# Patient Record
Sex: FEMALE | Race: WHITE | NOT HISPANIC OR LATINO
[De-identification: names, ages, dates, MRNs, and addresses within clinical notes are randomized per-mention and may not be internally consistent; named-entity substitution may affect disease eponyms.]

---

## 2017-01-17 ENCOUNTER — APPOINTMENT (OUTPATIENT)
Dept: CT IMAGING | Facility: HOSPITAL | Age: 52
End: 2017-01-17

## 2017-06-23 ENCOUNTER — APPOINTMENT (OUTPATIENT)
Dept: FAMILY MEDICINE | Facility: CLINIC | Age: 52
End: 2017-06-23

## 2017-07-24 ENCOUNTER — APPOINTMENT (OUTPATIENT)
Dept: FAMILY MEDICINE | Facility: CLINIC | Age: 52
End: 2017-07-24

## 2017-11-07 ENCOUNTER — APPOINTMENT (OUTPATIENT)
Dept: FAMILY MEDICINE | Facility: CLINIC | Age: 52
End: 2017-11-07

## 2017-12-14 ENCOUNTER — APPOINTMENT (OUTPATIENT)
Dept: ULTRASOUND IMAGING | Facility: HOSPITAL | Age: 52
End: 2017-12-14
Payer: MEDICAID

## 2017-12-14 ENCOUNTER — OUTPATIENT (OUTPATIENT)
Dept: OUTPATIENT SERVICES | Facility: HOSPITAL | Age: 52
LOS: 1 days | End: 2017-12-14
Payer: MEDICAID

## 2017-12-14 DIAGNOSIS — Z98.89 OTHER SPECIFIED POSTPROCEDURAL STATES: Chronic | ICD-10-CM

## 2017-12-14 DIAGNOSIS — Z98.51 TUBAL LIGATION STATUS: Chronic | ICD-10-CM

## 2017-12-14 DIAGNOSIS — K76.0 FATTY (CHANGE OF) LIVER, NOT ELSEWHERE CLASSIFIED: ICD-10-CM

## 2017-12-14 DIAGNOSIS — R10.9 UNSPECIFIED ABDOMINAL PAIN: ICD-10-CM

## 2017-12-14 DIAGNOSIS — R16.0 HEPATOMEGALY, NOT ELSEWHERE CLASSIFIED: ICD-10-CM

## 2017-12-14 PROCEDURE — 76700 US EXAM ABDOM COMPLETE: CPT | Mod: 26

## 2017-12-14 PROCEDURE — 76700 US EXAM ABDOM COMPLETE: CPT

## 2017-12-22 ENCOUNTER — APPOINTMENT (OUTPATIENT)
Dept: MAMMOGRAPHY | Facility: HOSPITAL | Age: 52
End: 2017-12-22

## 2018-03-16 ENCOUNTER — EMERGENCY (EMERGENCY)
Facility: HOSPITAL | Age: 53
LOS: 1 days | Discharge: HOME | End: 2018-03-16
Admitting: EMERGENCY MEDICINE
Payer: COMMERCIAL

## 2018-03-16 DIAGNOSIS — N39.0 URINARY TRACT INFECTION, SITE NOT SPECIFIED: ICD-10-CM

## 2018-03-16 DIAGNOSIS — Z98.89 OTHER SPECIFIED POSTPROCEDURAL STATES: Chronic | ICD-10-CM

## 2018-03-16 DIAGNOSIS — E03.9 HYPOTHYROIDISM, UNSPECIFIED: ICD-10-CM

## 2018-03-16 DIAGNOSIS — Z98.51 TUBAL LIGATION STATUS: Chronic | ICD-10-CM

## 2018-03-16 DIAGNOSIS — Z88.5 ALLERGY STATUS TO NARCOTIC AGENT: ICD-10-CM

## 2018-03-16 DIAGNOSIS — I10 ESSENTIAL (PRIMARY) HYPERTENSION: ICD-10-CM

## 2018-03-16 DIAGNOSIS — Z79.899 OTHER LONG TERM (CURRENT) DRUG THERAPY: ICD-10-CM

## 2018-03-16 DIAGNOSIS — R68.83 CHILLS (WITHOUT FEVER): ICD-10-CM

## 2018-03-16 PROCEDURE — 71046 X-RAY EXAM CHEST 2 VIEWS: CPT | Mod: 26

## 2018-03-16 PROCEDURE — 71046 X-RAY EXAM CHEST 2 VIEWS: CPT

## 2018-03-16 PROCEDURE — 99284 EMERGENCY DEPT VISIT MOD MDM: CPT

## 2018-03-16 PROCEDURE — 81003 URINALYSIS AUTO W/O SCOPE: CPT

## 2018-03-16 PROCEDURE — 93010 ELECTROCARDIOGRAM REPORT: CPT

## 2018-03-16 PROCEDURE — 80053 COMPREHEN METABOLIC PANEL: CPT

## 2018-03-16 PROCEDURE — 85027 COMPLETE CBC AUTOMATED: CPT

## 2018-03-16 PROCEDURE — 93005 ELECTROCARDIOGRAM TRACING: CPT

## 2018-03-16 PROCEDURE — 96360 HYDRATION IV INFUSION INIT: CPT

## 2018-03-16 PROCEDURE — 99283 EMERGENCY DEPT VISIT LOW MDM: CPT | Mod: 25

## 2018-03-16 PROCEDURE — 87086 URINE CULTURE/COLONY COUNT: CPT

## 2018-04-18 ENCOUNTER — APPOINTMENT (OUTPATIENT)
Dept: UROLOGY | Facility: CLINIC | Age: 53
End: 2018-04-18
Payer: COMMERCIAL

## 2018-04-18 DIAGNOSIS — R10.9 UNSPECIFIED ABDOMINAL PAIN: ICD-10-CM

## 2018-04-18 DIAGNOSIS — Z80.42 FAMILY HISTORY OF MALIGNANT NEOPLASM OF PROSTATE: ICD-10-CM

## 2018-04-18 PROCEDURE — 99203 OFFICE O/P NEW LOW 30 MIN: CPT

## 2018-04-18 RX ORDER — FLUTICASONE PROPIONATE 50 UG/1
50 SPRAY, METERED NASAL
Qty: 16 | Refills: 0 | Status: ACTIVE | COMMUNITY
Start: 2017-05-22

## 2018-04-18 RX ORDER — AZITHROMYCIN 250 MG/1
250 TABLET, FILM COATED ORAL
Qty: 6 | Refills: 0 | Status: DISCONTINUED | COMMUNITY
Start: 2017-05-25

## 2018-04-19 LAB
APPEARANCE: ABNORMAL
BACTERIA: ABNORMAL
BILIRUBIN URINE: NEGATIVE
BLOOD URINE: NEGATIVE
COLOR: YELLOW
GLUCOSE QUALITATIVE U: NEGATIVE MG/DL
HYALINE CASTS: 3 /LPF
KETONES URINE: NEGATIVE
LEUKOCYTE ESTERASE URINE: NEGATIVE
MICROSCOPIC-UA: NORMAL
NITRITE URINE: NEGATIVE
PH URINE: 5
PROTEIN URINE: 30 MG/DL
RED BLOOD CELLS URINE: 3 /HPF
SPECIFIC GRAVITY URINE: 1.02
SQUAMOUS EPITHELIAL CELLS: 18 /HPF
UROBILINOGEN URINE: NEGATIVE MG/DL
WHITE BLOOD CELLS URINE: 2 /HPF

## 2018-04-24 LAB — BACTERIA UR CULT: NORMAL

## 2018-05-11 ENCOUNTER — APPOINTMENT (OUTPATIENT)
Dept: FAMILY MEDICINE | Facility: CLINIC | Age: 53
End: 2018-05-11

## 2018-07-24 ENCOUNTER — OUTPATIENT (OUTPATIENT)
Dept: OUTPATIENT SERVICES | Facility: HOSPITAL | Age: 53
LOS: 1 days | End: 2018-07-24
Payer: COMMERCIAL

## 2018-07-24 ENCOUNTER — APPOINTMENT (OUTPATIENT)
Dept: RADIOLOGY | Facility: HOSPITAL | Age: 53
End: 2018-07-24
Payer: COMMERCIAL

## 2018-07-24 DIAGNOSIS — Z98.89 OTHER SPECIFIED POSTPROCEDURAL STATES: Chronic | ICD-10-CM

## 2018-07-24 DIAGNOSIS — R53.83 OTHER FATIGUE: ICD-10-CM

## 2018-07-24 DIAGNOSIS — Z98.51 TUBAL LIGATION STATUS: Chronic | ICD-10-CM

## 2018-07-24 PROCEDURE — 71046 X-RAY EXAM CHEST 2 VIEWS: CPT

## 2018-07-24 PROCEDURE — 71046 X-RAY EXAM CHEST 2 VIEWS: CPT | Mod: 26

## 2018-11-26 ENCOUNTER — EMERGENCY (EMERGENCY)
Facility: HOSPITAL | Age: 53
LOS: 1 days | Discharge: HOME | End: 2018-11-26
Attending: EMERGENCY MEDICINE | Admitting: EMERGENCY MEDICINE
Payer: COMMERCIAL

## 2018-11-26 VITALS
TEMPERATURE: 99 F | DIASTOLIC BLOOD PRESSURE: 86 MMHG | SYSTOLIC BLOOD PRESSURE: 166 MMHG | OXYGEN SATURATION: 100 % | RESPIRATION RATE: 20 BRPM | WEIGHT: 179.9 LBS | HEART RATE: 123 BPM | HEIGHT: 60 IN

## 2018-11-26 VITALS
RESPIRATION RATE: 18 BRPM | SYSTOLIC BLOOD PRESSURE: 159 MMHG | OXYGEN SATURATION: 99 % | DIASTOLIC BLOOD PRESSURE: 84 MMHG | HEART RATE: 93 BPM

## 2018-11-26 DIAGNOSIS — Z98.89 OTHER SPECIFIED POSTPROCEDURAL STATES: Chronic | ICD-10-CM

## 2018-11-26 DIAGNOSIS — R06.02 SHORTNESS OF BREATH: ICD-10-CM

## 2018-11-26 DIAGNOSIS — Z98.51 TUBAL LIGATION STATUS: Chronic | ICD-10-CM

## 2018-11-26 PROCEDURE — 99284 EMERGENCY DEPT VISIT MOD MDM: CPT | Mod: 25

## 2018-11-26 PROCEDURE — 71046 X-RAY EXAM CHEST 2 VIEWS: CPT

## 2018-11-26 PROCEDURE — 94640 AIRWAY INHALATION TREATMENT: CPT

## 2018-11-26 PROCEDURE — 71046 X-RAY EXAM CHEST 2 VIEWS: CPT | Mod: 26

## 2018-11-26 RX ORDER — ALBUTEROL 90 UG/1
2 AEROSOL, METERED ORAL
Qty: 1 | Refills: 0
Start: 2018-11-26

## 2018-11-26 RX ORDER — AZITHROMYCIN 500 MG/1
1 TABLET, FILM COATED ORAL
Qty: 4 | Refills: 0
Start: 2018-11-26 | End: 2018-11-29

## 2018-11-26 RX ORDER — IPRATROPIUM/ALBUTEROL SULFATE 18-103MCG
3 AEROSOL WITH ADAPTER (GRAM) INHALATION ONCE
Qty: 0 | Refills: 0 | Status: COMPLETED | OUTPATIENT
Start: 2018-11-26 | End: 2018-11-26

## 2018-11-26 RX ORDER — ALBUTEROL 90 UG/1
3 AEROSOL, METERED ORAL
Qty: 30 | Refills: 0
Start: 2018-11-26

## 2018-11-26 RX ORDER — AZITHROMYCIN 500 MG/1
500 TABLET, FILM COATED ORAL ONCE
Qty: 0 | Refills: 0 | Status: COMPLETED | OUTPATIENT
Start: 2018-11-26 | End: 2018-11-26

## 2018-11-26 RX ADMIN — AZITHROMYCIN 500 MILLIGRAM(S): 500 TABLET, FILM COATED ORAL at 05:19

## 2018-11-26 RX ADMIN — Medication 3 MILLILITER(S): at 04:55

## 2018-11-26 NOTE — ED ADULT NURSE NOTE - PSH
History of tonsillectomy    S/P D&C (status post dilation and curettage)  2013  Tubal ligation status

## 2018-11-26 NOTE — ED ADULT NURSE NOTE - OBJECTIVE STATEMENT
Patient presents with dry cough, nasal congestion and shortness of breath x 1 days. Lungs clear. Able to speak in full sentences on room air. No use of accessory muscles noted. Denies fever/chills. Skin pink, warm and dry.

## 2018-11-26 NOTE — ED ADULT NURSE NOTE - RESPIRATORY ASSESSMENT
[facility]  [address] [id number] [phone]      March 13, 2017      Re: Jeffery Capps  [address] [facility] [address]        TO WHOM IT MAY CONCERN:    Jeffery Capps is a patient of mine who uses his Albuterol inhaler as needed.  Typically, he takes 2 puffs of the inhaler, 10 minutes before exercise.    Please allow Jeffery to use his inhaler, if needed, during the 3/15/17 field trip.          Sincerely,        Hailey Contreras MD/ac           - - -

## 2018-11-26 NOTE — ED PROVIDER NOTE - PMH
Anxiety disorder due to medical condition    Arthritis of shoulder    Carpal tunnel syndrome of left wrist    Dysmenorrhea    Fatty liver    Fibroids, intramural    GERD (gastroesophageal reflux disease)    Hypertension    Hypothyroid    Kidney cysts    Lupus anticoagulant positive  saw heme 6 mths ago, instructed to take aspirin, pt non compliant  Menorrhagia    Morbid obesity with BMI of 40.0-44.9, adult    Thalassemia

## 2018-11-26 NOTE — ED PROVIDER NOTE - RESPIRATORY, MLM
good bilat breath sounds, faint minimal expiratory wheeze, scattered. no resp distress. actively coughing

## 2018-11-26 NOTE — ED PROVIDER NOTE - MEDICAL DECISION MAKING DETAILS
cough, chest tightness, c other uri sxs, sick contacts. likely URI. check cxr r/o pna. trial of nebs. reassess

## 2018-11-26 NOTE — ED PROVIDER NOTE - OBJECTIVE STATEMENT
pt c/o mild sob this morning, assoc c moderate cough for last 2 days, nasal congestion , mild sore throat. no fever/chills. +sick contacts c cough, "cold".  no chest pain. feel mild tightness mid chest. no leg pain/swelling /recent periods of immobility

## 2019-01-16 ENCOUNTER — APPOINTMENT (OUTPATIENT)
Dept: CARDIOLOGY | Facility: CLINIC | Age: 54
End: 2019-01-16
Payer: MEDICAID

## 2019-01-16 ENCOUNTER — NON-APPOINTMENT (OUTPATIENT)
Age: 54
End: 2019-01-16

## 2019-01-16 VITALS
OXYGEN SATURATION: 99 % | HEART RATE: 77 BPM | WEIGHT: 209 LBS | BODY MASS INDEX: 41.03 KG/M2 | DIASTOLIC BLOOD PRESSURE: 82 MMHG | RESPIRATION RATE: 16 BRPM | HEIGHT: 60 IN | SYSTOLIC BLOOD PRESSURE: 144 MMHG

## 2019-01-16 VITALS — HEART RATE: 76 BPM | SYSTOLIC BLOOD PRESSURE: 140 MMHG | DIASTOLIC BLOOD PRESSURE: 72 MMHG

## 2019-01-16 DIAGNOSIS — R53.83 OTHER FATIGUE: ICD-10-CM

## 2019-01-16 DIAGNOSIS — Z78.9 OTHER SPECIFIED HEALTH STATUS: ICD-10-CM

## 2019-01-16 PROCEDURE — 93000 ELECTROCARDIOGRAM COMPLETE: CPT

## 2019-01-16 PROCEDURE — 93306 TTE W/DOPPLER COMPLETE: CPT

## 2019-01-16 PROCEDURE — 99204 OFFICE O/P NEW MOD 45 MIN: CPT

## 2019-01-22 ENCOUNTER — APPOINTMENT (OUTPATIENT)
Dept: OBGYN | Facility: CLINIC | Age: 54
End: 2019-01-22

## 2019-01-28 ENCOUNTER — APPOINTMENT (OUTPATIENT)
Dept: CARDIOLOGY | Facility: CLINIC | Age: 54
End: 2019-01-28

## 2019-02-04 ENCOUNTER — APPOINTMENT (OUTPATIENT)
Dept: CARDIOLOGY | Facility: CLINIC | Age: 54
End: 2019-02-04

## 2019-02-08 ENCOUNTER — APPOINTMENT (OUTPATIENT)
Dept: CARDIOLOGY | Facility: CLINIC | Age: 54
End: 2019-02-08
Payer: MEDICAID

## 2019-02-08 PROCEDURE — 93270 REMOTE 30 DAY ECG REV/REPORT: CPT

## 2019-03-19 ENCOUNTER — NON-APPOINTMENT (OUTPATIENT)
Age: 54
End: 2019-03-19

## 2019-03-19 ENCOUNTER — APPOINTMENT (OUTPATIENT)
Dept: CARDIOLOGY | Facility: CLINIC | Age: 54
End: 2019-03-19
Payer: MEDICAID

## 2019-03-19 VITALS
BODY MASS INDEX: 41.03 KG/M2 | DIASTOLIC BLOOD PRESSURE: 84 MMHG | WEIGHT: 209 LBS | SYSTOLIC BLOOD PRESSURE: 150 MMHG | HEIGHT: 60 IN | RESPIRATION RATE: 16 BRPM | HEART RATE: 83 BPM | OXYGEN SATURATION: 98 %

## 2019-03-19 VITALS — SYSTOLIC BLOOD PRESSURE: 130 MMHG | HEART RATE: 84 BPM | DIASTOLIC BLOOD PRESSURE: 80 MMHG

## 2019-03-19 PROCEDURE — 99214 OFFICE O/P EST MOD 30 MIN: CPT

## 2019-03-19 PROCEDURE — 93000 ELECTROCARDIOGRAM COMPLETE: CPT

## 2019-03-19 NOTE — PHYSICAL EXAM
[General Appearance - Well Developed] : well developed [Normal Appearance] : normal appearance [Well Groomed] : well groomed [General Appearance - Well Nourished] : well nourished [No Deformities] : no deformities [General Appearance - In No Acute Distress] : no acute distress [Normal Oral Mucosa] : normal oral mucosa [No Oral Pallor] : no oral pallor [No Oral Cyanosis] : no oral cyanosis [Normal Jugular Venous A Waves Present] : normal jugular venous A waves present [Normal Jugular Venous V Waves Present] : normal jugular venous V waves present [No Jugular Venous Pollard A Waves] : no jugular venous pollard A waves [Exaggerated Use Of Accessory Muscles For Inspiration] : no accessory muscle use [Respiration, Rhythm And Depth] : normal respiratory rhythm and effort [Abdomen Soft] : soft [Auscultation Breath Sounds / Voice Sounds] : lungs were clear to auscultation bilaterally [Abdomen Tenderness] : non-tender [Abdomen Mass (___ Cm)] : no abdominal mass palpated [Abnormal Walk] : normal gait [Gait - Sufficient For Exercise Testing] : the gait was sufficient for exercise testing [Nail Clubbing] : no clubbing of the fingernails [Cyanosis, Localized] : no localized cyanosis [Petechial Hemorrhages (___cm)] : no petechial hemorrhages [Skin Color & Pigmentation] : normal skin color and pigmentation [] : no rash [Skin Lesions] : no skin lesions [No Venous Stasis] : no venous stasis [No Skin Ulcers] : no skin ulcer [No Xanthoma] : no  xanthoma was observed [Mood] : the mood was normal [Oriented To Time, Place, And Person] : oriented to person, place, and time [Affect] : the affect was normal [Normal Rate] : normal [No Anxiety] : not feeling anxious [Normal S1] : normal S1 [Normal S2] : normal S2 [S3] : no S3 [S4] : no S4 [No Murmur] : no murmurs heard [Right Carotid Bruit] : no bruit heard over the right carotid [Left Carotid Bruit] : no bruit heard over the left carotid [Right Femoral Bruit] : no bruit heard over the right femoral artery [Left Femoral Bruit] : no bruit heard over the left femoral artery [2+] : right 2+ [No Pitting Edema] : no pitting edema present [No Abnormalities] : the abdominal aorta was not enlarged and no bruit was heard

## 2019-03-19 NOTE — REASON FOR VISIT
[Follow-Up - Clinic] : a clinic follow-up of [Palpitations] : palpitations [FreeTextEntry1] : She continues to complain of palpitations. She states they can last for up to an hour. They usually in the morning. She does state that there is a possibility it could be associated with anxiety. Her monitor revealed no arrhythmia.

## 2019-03-19 NOTE — ASSESSMENT
[FreeTextEntry1] : Impression:\par 1. Palpitations etiology uncertain in a patient with a structurally normal heart who has had anemia in the past as well as hypothyroidism\par \par Plan:\par 1. Due to patient's concern over "blockages" we'll get exercise stress test\par 2. Switch atenolol which she has been taking twice per day 25 mg  to metoprolol 50 mg twice per day

## 2019-03-20 PROCEDURE — 93272 ECG/REVIEW INTERPRET ONLY: CPT

## 2019-04-03 ENCOUNTER — APPOINTMENT (OUTPATIENT)
Dept: CARDIOLOGY | Facility: CLINIC | Age: 54
End: 2019-04-03

## 2020-01-30 ENCOUNTER — APPOINTMENT (OUTPATIENT)
Dept: FAMILY MEDICINE | Facility: CLINIC | Age: 55
End: 2020-01-30

## 2020-02-07 ENCOUNTER — APPOINTMENT (OUTPATIENT)
Dept: INTERNAL MEDICINE | Facility: CLINIC | Age: 55
End: 2020-02-07

## 2020-02-14 ENCOUNTER — APPOINTMENT (OUTPATIENT)
Dept: INTERNAL MEDICINE | Facility: CLINIC | Age: 55
End: 2020-02-14

## 2020-03-30 ENCOUNTER — APPOINTMENT (OUTPATIENT)
Dept: INTERNAL MEDICINE | Facility: CLINIC | Age: 55
End: 2020-03-30

## 2020-04-15 ENCOUNTER — RX RENEWAL (OUTPATIENT)
Age: 55
End: 2020-04-15

## 2020-06-04 ENCOUNTER — APPOINTMENT (OUTPATIENT)
Dept: CARDIOLOGY | Facility: CLINIC | Age: 55
End: 2020-06-04

## 2020-07-21 ENCOUNTER — NON-APPOINTMENT (OUTPATIENT)
Age: 55
End: 2020-07-21

## 2020-08-12 ENCOUNTER — APPOINTMENT (OUTPATIENT)
Dept: PULMONOLOGY | Facility: CLINIC | Age: 55
End: 2020-08-12

## 2021-01-09 PROBLEM — Z01.419 ENCOUNTER FOR ANNUAL ROUTINE GYNECOLOGICAL EXAMINATION: Status: RESOLVED | Noted: 2021-01-09 | Resolved: 2021-01-23

## 2021-01-09 NOTE — PHYSICAL EXAM
[Alert] : alert [Appropriately responsive] : appropriately responsive [No Acute Distress] : no acute distress [Soft] : soft [Non-tender] : non-tender [Non-distended] : non-distended [No HSM] : No HSM [No Lesions] : no lesions [No Mass] : no mass [Oriented x3] : oriented x3 [Examination Of The Breasts] : a normal appearance [No Masses] : no breast masses were palpable [Labia Minora] : normal [Labia Majora] : normal [Normal] : normal [Uterine Adnexae] : normal

## 2021-01-12 ENCOUNTER — APPOINTMENT (OUTPATIENT)
Dept: OBGYN | Facility: CLINIC | Age: 56
End: 2021-01-12

## 2021-01-12 DIAGNOSIS — Z01.419 ENCOUNTER FOR GYNECOLOGICAL EXAMINATION (GENERAL) (ROUTINE) W/OUT ABNORMAL FINDINGS: ICD-10-CM

## 2021-04-12 ENCOUNTER — APPOINTMENT (OUTPATIENT)
Dept: CARDIOLOGY | Facility: CLINIC | Age: 56
End: 2021-04-12
Payer: MEDICAID

## 2021-04-12 ENCOUNTER — NON-APPOINTMENT (OUTPATIENT)
Age: 56
End: 2021-04-12

## 2021-04-12 VITALS
DIASTOLIC BLOOD PRESSURE: 74 MMHG | HEIGHT: 60 IN | WEIGHT: 220 LBS | BODY MASS INDEX: 43.19 KG/M2 | OXYGEN SATURATION: 97 % | TEMPERATURE: 97.6 F | RESPIRATION RATE: 20 BRPM | SYSTOLIC BLOOD PRESSURE: 143 MMHG | HEART RATE: 90 BPM

## 2021-04-12 DIAGNOSIS — R00.2 PALPITATIONS: ICD-10-CM

## 2021-04-12 DIAGNOSIS — R94.31 ABNORMAL ELECTROCARDIOGRAM [ECG] [EKG]: ICD-10-CM

## 2021-04-12 PROCEDURE — 93000 ELECTROCARDIOGRAM COMPLETE: CPT

## 2021-04-12 PROCEDURE — 99072 ADDL SUPL MATRL&STAF TM PHE: CPT

## 2021-04-12 PROCEDURE — 99214 OFFICE O/P EST MOD 30 MIN: CPT

## 2021-04-12 NOTE — REASON FOR VISIT
[Follow-Up - Clinic] : a clinic follow-up of [Palpitations] : palpitations [FreeTextEntry2] : pt last saw Dr Barron 2019, c/o inc in palps at night, hears them in her ears, no sob or sscp, very anxious.

## 2021-04-12 NOTE — PHYSICAL EXAM
[General Appearance - Well Developed] : well developed [Normal Appearance] : normal appearance [Well Groomed] : well groomed [General Appearance - Well Nourished] : well nourished [No Deformities] : no deformities [General Appearance - In No Acute Distress] : no acute distress [Normal Oral Mucosa] : normal oral mucosa [No Oral Pallor] : no oral pallor [No Oral Cyanosis] : no oral cyanosis [Normal Jugular Venous A Waves Present] : normal jugular venous A waves present [Normal Jugular Venous V Waves Present] : normal jugular venous V waves present [No Jugular Venous Pollard A Waves] : no jugular venous pollard A waves [Respiration, Rhythm And Depth] : normal respiratory rhythm and effort [Exaggerated Use Of Accessory Muscles For Inspiration] : no accessory muscle use [Auscultation Breath Sounds / Voice Sounds] : lungs were clear to auscultation bilaterally [Abdomen Soft] : soft [Abdomen Tenderness] : non-tender [Abdomen Mass (___ Cm)] : no abdominal mass palpated [Abnormal Walk] : normal gait [Gait - Sufficient For Exercise Testing] : the gait was sufficient for exercise testing [Nail Clubbing] : no clubbing of the fingernails [Cyanosis, Localized] : no localized cyanosis [Petechial Hemorrhages (___cm)] : no petechial hemorrhages [Skin Color & Pigmentation] : normal skin color and pigmentation [] : no rash [No Venous Stasis] : no venous stasis [Skin Lesions] : no skin lesions [No Skin Ulcers] : no skin ulcer [No Xanthoma] : no  xanthoma was observed [Oriented To Time, Place, And Person] : oriented to person, place, and time [Affect] : the affect was normal [Mood] : the mood was normal [No Anxiety] : not feeling anxious [Normal Rate] : normal [Normal S1] : normal S1 [Normal S2] : normal S2 [No Murmur] : no murmurs heard [2+] : left 2+ [No Abnormalities] : the abdominal aorta was not enlarged and no bruit was heard [No Pitting Edema] : no pitting edema present [S3] : no S3 [S4] : no S4 [Right Carotid Bruit] : no bruit heard over the right carotid [Left Carotid Bruit] : no bruit heard over the left carotid [Right Femoral Bruit] : no bruit heard over the right femoral artery [Left Femoral Bruit] : no bruit heard over the left femoral artery

## 2021-04-12 NOTE — DISCUSSION/SUMMARY
[Holter Monitor] : a Holter monitor [Echocardiogram] : an echocardiogram [de-identified] : carotid doppler [de-identified] : doesn’t take her metoprolol

## 2021-04-15 ENCOUNTER — APPOINTMENT (OUTPATIENT)
Dept: CARDIOLOGY | Facility: CLINIC | Age: 56
End: 2021-04-15
Payer: MEDICAID

## 2021-04-15 PROCEDURE — 93306 TTE W/DOPPLER COMPLETE: CPT

## 2021-04-15 PROCEDURE — 99072 ADDL SUPL MATRL&STAF TM PHE: CPT

## 2021-04-20 ENCOUNTER — NON-APPOINTMENT (OUTPATIENT)
Age: 56
End: 2021-04-20

## 2021-04-20 PROCEDURE — 99072 ADDL SUPL MATRL&STAF TM PHE: CPT

## 2021-04-20 PROCEDURE — 93224 XTRNL ECG REC UP TO 48 HRS: CPT

## 2021-04-27 ENCOUNTER — APPOINTMENT (OUTPATIENT)
Dept: CARDIOLOGY | Facility: CLINIC | Age: 56
End: 2021-04-27

## 2021-10-12 ENCOUNTER — APPOINTMENT (OUTPATIENT)
Dept: RADIOLOGY | Facility: HOSPITAL | Age: 56
End: 2021-10-12
Payer: MEDICAID

## 2021-10-12 ENCOUNTER — OUTPATIENT (OUTPATIENT)
Dept: OUTPATIENT SERVICES | Facility: HOSPITAL | Age: 56
LOS: 1 days | End: 2021-10-12
Payer: COMMERCIAL

## 2021-10-12 DIAGNOSIS — M25.561 PAIN IN RIGHT KNEE: ICD-10-CM

## 2021-10-12 DIAGNOSIS — M25.562 PAIN IN LEFT KNEE: ICD-10-CM

## 2021-10-12 DIAGNOSIS — Z98.89 OTHER SPECIFIED POSTPROCEDURAL STATES: Chronic | ICD-10-CM

## 2021-10-12 DIAGNOSIS — Z98.51 TUBAL LIGATION STATUS: Chronic | ICD-10-CM

## 2021-10-12 PROCEDURE — 73562 X-RAY EXAM OF KNEE 3: CPT | Mod: 26,50

## 2021-10-12 PROCEDURE — 73562 X-RAY EXAM OF KNEE 3: CPT

## 2021-12-23 ENCOUNTER — EMERGENCY (EMERGENCY)
Facility: HOSPITAL | Age: 56
LOS: 1 days | Discharge: HOME | End: 2021-12-23
Attending: EMERGENCY MEDICINE | Admitting: EMERGENCY MEDICINE
Payer: SELF-PAY

## 2021-12-23 VITALS
HEART RATE: 87 BPM | DIASTOLIC BLOOD PRESSURE: 83 MMHG | SYSTOLIC BLOOD PRESSURE: 179 MMHG | WEIGHT: 190.04 LBS | OXYGEN SATURATION: 99 % | RESPIRATION RATE: 16 BRPM | TEMPERATURE: 99 F | HEIGHT: 60 IN

## 2021-12-23 DIAGNOSIS — Z98.51 TUBAL LIGATION STATUS: Chronic | ICD-10-CM

## 2021-12-23 DIAGNOSIS — Z98.89 OTHER SPECIFIED POSTPROCEDURAL STATES: Chronic | ICD-10-CM

## 2021-12-23 PROCEDURE — U0003: CPT

## 2021-12-23 PROCEDURE — 99284 EMERGENCY DEPT VISIT MOD MDM: CPT

## 2021-12-23 PROCEDURE — 99283 EMERGENCY DEPT VISIT LOW MDM: CPT

## 2021-12-23 PROCEDURE — U0005: CPT

## 2021-12-23 NOTE — ED PROVIDER NOTE - PATIENT PORTAL LINK FT
You can access the FollowMyHealth Patient Portal offered by [facility] by registering at the following website: [email or web address]. By joining [employer]’s FollowMyHealth portal, you will also be able to view your health information using other applications (apps) compatible with our system.

## 2021-12-23 NOTE — ED PROVIDER NOTE - NSICDXPASTSURGICALHX_GEN_ALL_CORE_FT
PAST SURGICAL HISTORY:  History of tonsillectomy     S/P D&C (status post dilation and curettage) 2013    Tubal ligation status

## 2021-12-23 NOTE — ED PROVIDER NOTE - NSFOLLOWUPINSTRUCTIONS_ED_ALL_ED_FT
Follow up with your Primary Care Physician within 1-2 days.   You have given us permission to text you your COVID test results which may take 1-2 days  Stay home and quarantine yourself until we get your test results.  Rest, increase your fluids.   Take Tylenol 650mg every 4-6 hours as needed for pain   Take all of your medications as previously prescribed.   If you have not heard from us in 2 days you can call  [phone]  Worsening, continued or ANY new concerning symptoms return to the Emergency Department.       Head Injury, Adult:    There are many types of head injuries. Head injuries can be as minor as a bump, or they can be more severe. More severe head injuries include:  A jarring injury to the brain (concussion).  A bruise of the brain (contusion). This means there is bleeding in the brain that can cause swelling.  A cracked skull (skull fracture).  Bleeding in the brain that collects, clots, and forms a bump (hematoma).  After a head injury, you may need to be observed for a while in the emergency department or urgent care. Sometimes admission to the hospital is needed.    After a head injury has happened, most problems occur within the first 24 hours, but side effects may occur up to 7–10 days after the injury. It is important to watch your condition for any changes.    What are the causes?  There are many possible causes of a head injury. A serious head injury may happen to someone who is in a car accident (motor vehicle collision). Other causes of major head injuries include bicycle or motorcycle accidents, sports injuries, and falls.    Risk factors  This condition is more likely to occur in people who:  Drink a lot of alcohol or use drugs.  Are over the age of 65.  Are at risk for falls.  What are the symptoms?  There are many possible symptoms of a head injury. Visible symptoms of a head injury include a bruise, bump, or bleeding at the site of the injury. Other non-visible symptoms include:  Feeling sleepy or not being able to stay awake.  Passing out.  Headache.  Seizures.  Dizziness.  Confusion.  Memory problems.  Nausea or vomiting.  Other possible symptoms that may develop after the head injury include:  Poor attention and concentration.  Fatigue or tiring easily.  Irritability.  Being uncomfortable around bright lights or loud noises.  Anxiety or depression.  Disturbed sleep.  How is this diagnosed?  This condition can usually be diagnosed based on your symptoms, a description of the injury, and a physical exam. You may also have imaging tests done, such as a CT scan or MRI. You will also be closely watched.    How is this treated?  Treatment for this condition depends on the severity and type of injury you have. The main goal of treatment is to prevent complications and allow the brain time to heal.    For mild head injury, you may be sent home and treatment may include:  Observation. A responsible adult should stay with you for 24 hours after your injury and check on you often.  Physical rest.  Brain rest.  Pain medicines.  For severe brain injury, treatment may include:  Close observation. This includes hospitalization with frequent physical exams. You may need to go to a hospital that specializes in head injury.  Pain medicines.  Breathing support. This may include using a ventilator.  Managing the pressure inside the brain (intracranial pressure, or ICP). This may include:  Monitoring the ICP.  Giving medicines to decrease the ICP.  Positioning you to decrease the ICP.  Medicine to prevent seizures.  Surgery to stop bleeding or to remove blood clots (craniotomy).  Surgery to remove part of the skull (decompressive craniectomy). This allows room for the brain to swell.  Follow these instructions at home:  Activity     Rest as much as possible and avoid activities that are physically hard or tiring.  Make sure you get enough sleep.  Limit activities that require a lot of thought or attention, such as:  Watching TV.  Playing memory games and puzzles.  Job-related work or homework.  Working on the computer, social media, and texting.  Avoid activities that could cause another head injury, such as playing sports, until your health care provider approves. Having another head injury, especially before the first one has healed, can be dangerous.  Ask your health care provider when it is safe for you to return to your regular activities, including work or school. Ask your health care provider for a step-by-step plan for gradually returning to activities.  Ask your health care provider when you can drive, ride a bicycle, or use heavy machinery. Your ability to react may be slower after a brain injury. Never do these activities if you are dizzy.  Lifestyle     Do not drink alcohol until your health care provider approves, and avoid drug use. Alcohol and certain drugs may slow your recovery and can put you at risk of further injury.  If it is harder than usual to remember things, write them down.  If you are easily distracted, try to do one thing at a time.  Talk with family members or close friends when making important decisions.  Tell your friends, family, a trusted colleague, and  about your injury, symptoms, and restrictions. Have them watch for any new or worsening problems.  General instructions     Take over-the-counter and prescription medicines only as told by your health care provider.  Have someone stay with you for 24 hours after your head injury. This person should watch you for any changes in your symptoms and be ready to seek medical help, as needed.  Keep all follow-up visits as told by your health care provider. This is important.  How is this prevented?  Work on improving your balance and strength to avoid falls.  Wear a seatbelt when you are in a moving vehicle.  Wear a helmet when riding a bicycle, skiing, or doing any other sport or activity that has a risk of injury.  Drink alcohol only in moderation.  Take safety measures in your home, such as:  Removing clutter and tripping hazards from floors and stairways.  Using grab bars in bathrooms and handrails by stairs.  Placing non-slip mats on floors and in bathtubs.  Improving lighting in dim areas.  Get help right away if:  You have:  A severe headache that is not helped by medicine.  Trouble walking, have weakness in your arms and legs, or lose your balance.  Clear or bloody fluid coming from your nose or ears.  Changes in your vision.  A seizure.  You vomit.  Your symptoms get worse.  Your speech is slurred.  You pass out.  You are sleepier and have trouble staying awake.  Your pupils change size.  These symptoms may represent a serious problem that is an emergency. Do not wait to see if the symptoms will go away. Get medical help right away. Call your local emergency services (911 in the [place] Do not drive yourself to the hospital.     This information is not intended to replace advice given to you by your health care provider. Make sure you discuss any questions you have with your health care provider.
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
## 2021-12-23 NOTE — ED PROVIDER NOTE - NSICDXPASTMEDICALHX_GEN_ALL_CORE_FT
PAST MEDICAL HISTORY:  Anxiety disorder due to medical condition     Arthritis of shoulder     Carpal tunnel syndrome of left wrist     Dysmenorrhea     Fatty liver     Fibroids, intramural     GERD (gastroesophageal reflux disease)     Hypertension     Hypothyroid     Kidney cysts     Lupus anticoagulant positive saw heme 6 mths ago, instructed to take aspirin, pt non compliant    Menorrhagia     Morbid obesity with BMI of 40.0-44.9, adult     Thalassemia

## 2021-12-24 LAB — SARS-COV-2 RNA SPEC QL NAA+PROBE: SIGNIFICANT CHANGE UP

## 2022-01-04 ENCOUNTER — APPOINTMENT (OUTPATIENT)
Dept: RADIOLOGY | Facility: HOSPITAL | Age: 57
End: 2022-01-04
Payer: MEDICAID

## 2022-01-04 ENCOUNTER — OUTPATIENT (OUTPATIENT)
Dept: OUTPATIENT SERVICES | Facility: HOSPITAL | Age: 57
LOS: 1 days | End: 2022-01-04
Payer: MEDICAID

## 2022-01-04 DIAGNOSIS — Z98.51 TUBAL LIGATION STATUS: Chronic | ICD-10-CM

## 2022-01-04 DIAGNOSIS — R05.9 COUGH, UNSPECIFIED: ICD-10-CM

## 2022-01-04 DIAGNOSIS — Z98.89 OTHER SPECIFIED POSTPROCEDURAL STATES: Chronic | ICD-10-CM

## 2022-01-04 DIAGNOSIS — U07.1 COVID-19: ICD-10-CM

## 2022-01-04 PROCEDURE — 71046 X-RAY EXAM CHEST 2 VIEWS: CPT | Mod: 26

## 2022-01-04 PROCEDURE — 71046 X-RAY EXAM CHEST 2 VIEWS: CPT

## 2022-02-15 NOTE — ED PROVIDER NOTE - CPE EDP ENMT NORM
normal... Bilobed Transposition Flap Text: The defect edges were debeveled with a #15 scalpel blade.  Given the location of the defect and the proximity to free margins a bilobed transposition flap was deemed most appropriate.  Using a sterile surgical marker, an appropriate bilobe flap drawn around the defect.    The area thus outlined was incised deep to adipose tissue with a #15 scalpel blade.  The skin margins were undermined to an appropriate distance in all directions utilizing iris scissors.

## 2022-02-19 ENCOUNTER — EMERGENCY (EMERGENCY)
Facility: HOSPITAL | Age: 57
LOS: 1 days | Discharge: HOME | End: 2022-02-19
Attending: STUDENT IN AN ORGANIZED HEALTH CARE EDUCATION/TRAINING PROGRAM | Admitting: STUDENT IN AN ORGANIZED HEALTH CARE EDUCATION/TRAINING PROGRAM
Payer: MEDICAID

## 2022-02-19 VITALS
RESPIRATION RATE: 24 BRPM | HEIGHT: 60 IN | OXYGEN SATURATION: 100 % | SYSTOLIC BLOOD PRESSURE: 171 MMHG | DIASTOLIC BLOOD PRESSURE: 84 MMHG | WEIGHT: 184.97 LBS | HEART RATE: 100 BPM | TEMPERATURE: 99 F

## 2022-02-19 VITALS
HEART RATE: 77 BPM | DIASTOLIC BLOOD PRESSURE: 78 MMHG | SYSTOLIC BLOOD PRESSURE: 130 MMHG | OXYGEN SATURATION: 98 % | RESPIRATION RATE: 20 BRPM | TEMPERATURE: 98 F

## 2022-02-19 DIAGNOSIS — Z98.51 TUBAL LIGATION STATUS: Chronic | ICD-10-CM

## 2022-02-19 DIAGNOSIS — Z98.89 OTHER SPECIFIED POSTPROCEDURAL STATES: Chronic | ICD-10-CM

## 2022-02-19 LAB
FLUAV AG NPH QL: SIGNIFICANT CHANGE UP
FLUBV AG NPH QL: SIGNIFICANT CHANGE UP
RSV RNA NPH QL NAA+NON-PROBE: SIGNIFICANT CHANGE UP
SARS-COV-2 RNA SPEC QL NAA+PROBE: SIGNIFICANT CHANGE UP

## 2022-02-19 PROCEDURE — 71045 X-RAY EXAM CHEST 1 VIEW: CPT | Mod: 26

## 2022-02-19 PROCEDURE — 87637 SARSCOV2&INF A&B&RSV AMP PRB: CPT

## 2022-02-19 PROCEDURE — 99284 EMERGENCY DEPT VISIT MOD MDM: CPT

## 2022-02-19 PROCEDURE — 71045 X-RAY EXAM CHEST 1 VIEW: CPT

## 2022-02-19 PROCEDURE — 94640 AIRWAY INHALATION TREATMENT: CPT

## 2022-02-19 PROCEDURE — 99283 EMERGENCY DEPT VISIT LOW MDM: CPT | Mod: 25

## 2022-02-19 RX ORDER — IPRATROPIUM/ALBUTEROL SULFATE 18-103MCG
3 AEROSOL WITH ADAPTER (GRAM) INHALATION ONCE
Refills: 0 | Status: COMPLETED | OUTPATIENT
Start: 2022-02-19 | End: 2022-02-19

## 2022-02-19 RX ORDER — ACETAMINOPHEN 500 MG
650 TABLET ORAL ONCE
Refills: 0 | Status: COMPLETED | OUTPATIENT
Start: 2022-02-19 | End: 2022-02-19

## 2022-02-19 RX ADMIN — Medication 650 MILLIGRAM(S): at 05:19

## 2022-02-19 RX ADMIN — Medication 100 MILLIGRAM(S): at 05:19

## 2022-02-19 RX ADMIN — Medication 3 MILLILITER(S): at 05:19

## 2022-02-19 NOTE — ED PROVIDER NOTE - OBJECTIVE STATEMENT
57 yo F hx hypothyroid, HTN, pw 1 day non productive cough and sob. 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 Pt used home nebulizer yesterday, did not feel better.

## 2022-02-19 NOTE — ED PROVIDER NOTE - PHYSICAL EXAMINATION
GEN: Patient awake alert NAD.   HEENT: normocephalic, atraumatic, EOMI, +mild cobblestoning in posterior pharynx.   CARDIAC: +RRR, S1, S2, no murmur.   PULM: CTA B/L + minimal wheeze, no rhonchi, no rales.   ABD: soft NT, ND, no rebound no guarding  MSK: Moving all extremities, no edema. 5/5 strength and full ROM in all extremities.     NEURO: A&Ox3, gait normal, no focal neurological deficits, CN 2-12 grossly intact  SKIN: warm, dry, no rash.

## 2022-02-19 NOTE — ED PROVIDER NOTE - PATIENT PORTAL LINK FT
You can access the FollowMyHealth Patient Portal offered by [facility] by registering at the following website: [email or web address]. By joining [employer]’s FollowMyHealth portal, you will also be able to view your health information using other applications (apps) compatible with our system.

## 2022-02-19 NOTE — ED ADULT NURSE REASSESSMENT NOTE - NS ED NURSE REASSESS COMMENT FT1
Patient reports feeling better at this time. No longer with cough and reports that breathing has improved.

## 2022-02-19 NOTE — ED ADULT TRIAGE NOTE - CHIEF COMPLAINT QUOTE
Patient comes to ER for several day history of dry-cough and congestion, +sick contacts at home (grandchildren). Reports that tonight woke up with shortness of breath. +dry, hacking cough in triage with no sputum production, low grade fever reported at home (99F).

## 2022-02-19 NOTE — ED PROVIDER NOTE - NSFOLLOWUPINSTRUCTIONS_ED_ALL_ED_FT
** You have bronchitis from your viral upper respiratory tract infection.   ** Take over the counter Tylenol for fever. Stay hydrated  ** Your preliminary xray does not show a pneumonia.   ** Follow up with your primary care doctor in the next 72 hours.     ** Go to the nearest Emergency Department if you experience any new or concerning symptoms, such as:   - chest pain  - difficulty breathing  - passing out  - unable to eat or drink  - unable to move or feel part of your body  - fever, chills ** You have bronchitis from your viral upper respiratory tract infection.  ** Your preliminary xray does not show a pneumonia.   ** Follow up with your primary care doctor in the next 72 hours.     Take cough drops, hot tea with honey, use your humidifier, try to limit your coughing.     ** Go to the nearest Emergency Department if you experience any new or concerning symptoms, such as:   - chest pain  - difficulty breathing  - passing out  - unable to eat or drink  - unable to move or feel part of your body  - fever, chills

## 2022-02-19 NOTE — ED PROVIDER NOTE - CLINICAL SUMMARY MEDICAL DECISION MAKING FREE TEXT BOX
55 yo F hx hypothyroid, HTN, pw 1 day non productive cough and sob. + sick contacts. + minimal wheeze, + cobblestoning. Likely bronchitis 2/2 to post nasal drip from URI. Lower suspicion for PNA. low suspicion for bacterial PNA. symptom management with neb, tesselon musa, flu swab, CXR (r/o PNA). Likely DC home

## 2022-04-09 ENCOUNTER — EMERGENCY (EMERGENCY)
Facility: HOSPITAL | Age: 57
LOS: 1 days | Discharge: HOME | End: 2022-04-09
Attending: STUDENT IN AN ORGANIZED HEALTH CARE EDUCATION/TRAINING PROGRAM | Admitting: STUDENT IN AN ORGANIZED HEALTH CARE EDUCATION/TRAINING PROGRAM
Payer: MEDICAID

## 2022-04-09 VITALS
RESPIRATION RATE: 18 BRPM | SYSTOLIC BLOOD PRESSURE: 142 MMHG | DIASTOLIC BLOOD PRESSURE: 75 MMHG | HEART RATE: 89 BPM | OXYGEN SATURATION: 99 %

## 2022-04-09 VITALS
RESPIRATION RATE: 22 BRPM | OXYGEN SATURATION: 98 % | DIASTOLIC BLOOD PRESSURE: 84 MMHG | WEIGHT: 190.04 LBS | HEART RATE: 102 BPM | HEIGHT: 60 IN | SYSTOLIC BLOOD PRESSURE: 153 MMHG | TEMPERATURE: 99 F

## 2022-04-09 DIAGNOSIS — Z98.89 OTHER SPECIFIED POSTPROCEDURAL STATES: Chronic | ICD-10-CM

## 2022-04-09 DIAGNOSIS — Z98.51 TUBAL LIGATION STATUS: Chronic | ICD-10-CM

## 2022-04-09 PROCEDURE — 71045 X-RAY EXAM CHEST 1 VIEW: CPT | Mod: 26

## 2022-04-09 PROCEDURE — 99283 EMERGENCY DEPT VISIT LOW MDM: CPT | Mod: 25

## 2022-04-09 PROCEDURE — 71045 X-RAY EXAM CHEST 1 VIEW: CPT

## 2022-04-09 PROCEDURE — 99284 EMERGENCY DEPT VISIT MOD MDM: CPT

## 2022-04-09 PROCEDURE — 94640 AIRWAY INHALATION TREATMENT: CPT

## 2022-04-09 RX ORDER — IPRATROPIUM/ALBUTEROL SULFATE 18-103MCG
3 AEROSOL WITH ADAPTER (GRAM) INHALATION ONCE
Refills: 0 | Status: COMPLETED | OUTPATIENT
Start: 2022-04-09 | End: 2022-04-09

## 2022-04-09 RX ADMIN — Medication 3 MILLILITER(S): at 00:49

## 2022-04-09 NOTE — ED PROVIDER NOTE - CLINICAL SUMMARY MEDICAL DECISION MAKING FREE TEXT BOX
55 yO F hx of hypothyroid, pw 1 day non productive cough. 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 No fever chills, no CP, speaks in full sentences, spo2 100% on RA.     incomplete 55 yO F hx of hypothyroid, pw 1 day non productive cough. 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 No fever chills, no CP, speaks in full sentences, spo2 100% on RA. + minimal exp on exam. ddx: viral URI. Very low suspicion for PNA given sick contacts, well appearance but will obtain cxr. DC home with pmd fu.

## 2022-04-09 NOTE — ED ADULT TRIAGE NOTE - AS HEIGHT TYPE
Mom called with concerns about GJ tube site. She reports that [address] tube site looks erythematous, with purulent discharge and tenderness. No problems with GJ tube function. Suggested that this is most likely  Gastrostomy site infection and recommended the following:     - Clean the site with soap and water 3 times daily  - Apply Mupirocin on site 3 times daily  - Start Keflex for 10 days given significant erythema and tenderness.   - Follow up with Selam Lundberg NP or Dr. Armin Crespo next week. Orders Placed This Encounter    cephALEXin (KEFLEX) 250 mg/5 mL suspension     Sig: Take 3.2 mL by mouth four (4) times daily for 10 days. Dispense:  130 mL     Refill:  0    mupirocin (BACTROBAN) 2 % ointment     Sig: Apply  to affected area three (3) times daily.      Dispense:  22 g     Refill:  Kelsea Sterling MD  [facility]  10/04/20 9:47 AM stated

## 2022-04-09 NOTE — ED ADULT TRIAGE NOTE - BSA (M2)
DELIVERY ROOM CONSULTATION      NICU Delivery Room Consult: Reason for Consult: Caesarian Section and Failed Vaccuum extraction, Consult requested by: Dr. Parry    Mom is:  Information for the patient's mother:  Angie Vee [[id number]]   21 year old      Information for the patient's mother:  Angie Vee [[id number]]       Gestational Age: 41w1d at delivery    Maternal history includes:    Information for the patient's mother:  Angie Vee [[id number]]     Past Medical History:   Diagnosis Date   • Bipolar 1 disorder, depressed (CMS/HCC)    • JESS (generalized anxiety disorder)    • Obesity       Information for the patient's mother:  Vee Adams [[id number]]     Social History     Tobacco Use   • Smoking status: Never Smoker   • Smokeless tobacco: Never Used   Vaping Use   • Vaping Use: never used   Substance Use Topics   • Alcohol use: Never   • Drug use: Never      Maternal Social History  Information for the patient's mother:  DangeloVee franks [[id number]]     Social History     Tobacco Use   • Smoking status: Never Smoker   • Smokeless tobacco: Never Used   Vaping Use   • Vaping Use: never used   Substance Use Topics   • Alcohol use: Never   • Drug use: Never      Information for the patient's mother:  Vee Adams [[id number]]     Sexually Active: Not Asked        Information for the patient's mother:  Vee Adams [[id number]]     Drug Use:    Never           Information for the patient's mother:  Vee Adams [[id number]]   Review of patient's social economics indicates:  No social economics status on file     Information for the patient's mother:  AngieVee [[id number]]     Medications Prior to Admission   Medication Sig Dispense Refill   • risperiDONE (RisperDAL) 0.25 MG tablet TAKE 1 TABLET BY MOUTH TWICE DAILY 60 tablet 2   • Prenatal Vit-Fe Fumarate-FA (PRENATAL VITAMIN PO)           Pregnancy  1.83 Complications:  None   Prenatal Labs:  Information for the patient's mother:  Vee Adams [[id number]]     Recent Labs   Lab 02/09/21  1442 02/09/21  1427   HIV Antigen/ Antibody Combo Screen Nonreactive  --    Treponema Pallidum Antibody, IgG and IgM Nonreactive  --    Neisseria gonorrhoeae by Nucleic Acid Amplification  --  Negative   Chlamydia trachomatis by Nucleic Acid Amplification  --  Negative   Rubella Antibody, IgG 330.4  --         Maternal HepB status: Resulted - Negative/Non-Reactive  Maternal RPR status: Resulted - Negative/Non-Reactive  Maternal GBBS status: Resulted - Negative  Maternal HIV status: Resulted - Negative/Non-Reactive  Maternal Rubella status: Resulted - Immune                Maternal Inpatient Meds:  Information for the patient's mother:  AngieVee [[id number]]     Current Facility-Administered Medications   Medication   • lactated ringers bolus 250 mL   • azithromycin (ZITHROMAX) 500 mg in sodium chloride 0.9 % 250 mL IVPB   • ceFAZolin (ANCEF) syringe 2,000 mg   • risperiDONE (RisperDAL) tablet 0.25 mg   • measles-mumps-rubella vaccine 0.5 mL   • varicella virus (VARIVAX) live vaccine 0.5 mL   • diphtheria-pertussis (acellular)-tetanus (BOOSTRIX) 5-2.5-18.5 LF-MCG/0.5 vaccine 0.5 mL   • oxytocin (PITOCIN) 30 Units in sodium chloride 0.9% 500 mL   • multivitamin & mineral w/folic acid 1 mg-PRENATAL tablet 1 tablet   • benzocaine/menthol (DERMOPLAST) 20-0.5 % topical spray 1 spray   • hydroCORTisone (ANUSOL-HC) suppository 25 mg   • simethicone (MYLICON) tablet 125 mg   • calcium carbonate (TUMS) chewable tablet 500 mg   • ondansetron (ZOFRAN) injection 4 mg   • prochlorperazine (COMPAZINE) tablet 5 mg   • prochlorperazine (COMPAZINE) injection 5 mg   • docusate sodium-sennosides (SENOKOT S) 50-8.6 MG 2 tablet   • lactated ringers infusion   • acetaminophen (TYLENOL) tablet 650 mg   • HYDROcodone-acetaminophen (NORCO) 5-325 MG per tablet 1 tablet   • gentamicin (GARAMYCIN) 310 mg in  sodium chloride 0.9 % 50 mL IVPB   • bisacodyl (DULCOLAX) suppository 10 mg   • polyethylene glycol (MIRALAX) packet 17 g   • magnesium hydroxide (MILK OF MAGNESIA) 400 MG/5ML suspension 30 mL   • ketorolac injection 30 mg    Followed by   • ibuprofen (MOTRIN) tablet 600 mg        Inpatient Baby Meds:  • hepatitis B  0.5 mL Intramuscular Once    lidocaine HCl (PF), hepatitis B IMMUNE GLOBULIN     Labor  Delivery Method:  , Low Transverse [251]  Rupture Date:  2021  Rupture Time: 6:15 AM  YOB: 2020  Time of Birth:  10:37 PM       BIRTH HISTORY:     Delivery Method: , Low Transverse [251]   Rupture date & time: 2021 6:15 AM   Date & time of birth 2020 10:37 PM   Induction:       Labor complications:       Placenta appearance: Intact   Cord info/complications: 3 Vessels None       Delayed cord clamping: No   Indications for : Cephalopelvic Disproportion;Arrest of Descent   Presentation/position: Vertex Right Occiput Posterior   Forceps attempted? No     Vacuum attempted? Yes     Shoulder dystocia? No                         Resuscitation:    Baby required Drying,   Suctioning    Narrative Summary: baby active and vigorou           APGARS  One minute Five minutes   Skin color: 0  1    Heart rate: 2  2     Reflex: 2  2    Muscle tone: 2  2    Breathin  2    Totals:   8  9        Birth Measurements:  Weight:  8 lb 6 oz (3800 g)    Length:  21\"    Head circumference:  36.2 cm     Health Care Maintenance:  Boy's Birth Weight:  8 lb 6 oz (3800 g)   Wt Readings from Last 4 Encounters:   21 3800 g (81 %, Z= 0.89)*     * Growth percentiles are based on WHO (Boys, 0-2 years) data.     Change from birth weight:  0%    No results found  Recent Labs   Lab 21  0752   GLUCOSE BEDSIDE 48       Hematologic/Blood Type:   Infant: No results found          Transcutaneous Bilirubin  TCB Result:    TCB Site:          Labs (Last 24 hours)  Recent Results (from the past  24 hour(s))   CORD BLOOD EVAL SAVE    Collection Time: 07/23/21 10:54 PM   Result Value Ref Range    Extra Tube Hold for Add Ons    Blood Gas, Arterial Cord Blood    Collection Time: 07/23/21 10:54 PM   Result Value Ref Range    pH, Arterial Cord Blood 7.24 7.18 - 7.38 Units    pCO2, Arterial Cord Blood 55 31 - 74 mm Hg    pO2, Arterial Cord Blood <25 6 - 31 mm Hg    HCO3, Arterial Cord Blood 23 21 - 28 mmol/L    Base Excess/ Deficit, Arterial Cord Blood -6 mmol/L   CBC with Automated Differential (performable only)    Collection Time: 07/23/21 11:44 PM   Result Value Ref Range    WBC 17.8 5.0 - 30.0 K/mcL    RBC 4.93 3.90 - 5.50 mil/mcL    HGB 18.5 13.5 - 19.5 g/dL    HCT 54.8 42.0 - 60.0 %    .2 98.0 - 118.0 fl    MCH 37.5 (H) 31.0 - 37.0 pg    MCHC 33.8 30.0 - 36.0 g/dL    RDW-CV 19.7 (H) 11.0 - 15.0 %    RDW-SD 78.3 (H) 39.0 - 54.0 fL     140 - 450 K/mcL    NRBC 19 (H) <=0 /100 WBC   Manual Differential    Collection Time: 07/23/21 11:44 PM   Result Value Ref Range    Neutrophil, Percent 62 %    Lymphocytes, Percent 27 %    Mono, Percent 8 %    Eosinophils, Percent 0 %    Basophils, Percent 0 %    Bands, Percent 3 0 - 10 %    Absolute Neutrophil 11.6 6.0 - 26.0 K/mcL    Absolute Lymphocytes 4.8 2.0 - 11.0 K/mcL    Absolute Monocytes 1.4 0.4 - 1.8 K/mcL    Absolute Eosinophils 0.0 0.0 - 0.7 K/mcL    Absolute Basophils 0.0 0.0 - 0.6 K/mcL    WBC Morphology Normal Normal    Macrocytosis Moderate     Platelet Morphology Normal Normal    Polychromasia Few    GLUCOSE, BEDSIDE - POINT OF CARE    Collection Time: 07/24/21  7:52 AM   Result Value Ref Range    GLUCOSE, BEDSIDE - POINT OF CARE 48 36 - 110 mg/dL        Vital 24 Hour Range Last Value   Temperature Temp  Min: 97.4 °F (36.3 °C)  Max: 99.2 °F (37.3 °C) (!) 97.4 °F (36.3 °C) (baby placed skin to skin) (07/24/21 0754)   Pulse Pulse  Min: 120  Max: 144 140 (07/24/21 0754)   Respiratory Resp  Min: 36  Max: 48 42 (07/24/21 0754)   Non-Invasive  Blood  Pressure No data recorded     Arterial  Blood Pressure No data recorded     Pulse Oximetry No data recorded         PHYSICAL EXAM    GENERAL:  Alert, vigorous male with appropriate behavior.  He is in no acute distress.  SKIN:  His skin is warm with normal turgor.  The color of the skin is pink.  There is no rash.  There are no bruises or other signs of injury.  No significant jaundice.  HEAD:  The head is atraumatic and normocephalic.  The anterior fontanel is open and flat.  EYES:  Opens both eyes equally.  Red reflexes Red Reflex Deferred  EARS:  Pinnae and external ear canals normal.  NOSE:  There is no nasal flaring, nares patent bilaterally.  THROAT:  The oropharynx is normal.  There is no cleft of the palate.  NECK:  Clavicles without crepitus.  TRUNK AND THORAX:  There are no lesions on the trunk; there is no dimple over the presacral area.  There are no retractions.  LUNGS:  The lung fields are clear to auscultation.  HEART:  The precordium is quiet.  The heart rhythm is grossly regular.  There are no murmurs.  The femoral pulses are normal.  ABDOMEN:  The umbilical cord stump is normal.  Three-vessel cord.  There is not an umbilical hernia.  The abdomen is flat and soft.   GENITALIA:  normal male genitalia with bilateral descended testes.    RECTAL:  Anus patent.  EXTREMITIES:  Moving all 4 extremities.  The hip exam is normal.  There are no hip clicks. Normal Ortalani's and Tesfaye's.  NEUROLOGIC:  he displays normal tone throughout.  He is not jittery and he has normal  reflexes.  Keisha reflex present. No focal deficits.    Discharge Planning:    Hearing screen :       [place] Screen drawn:      CHD Screening   Screening complete:    Right hand reading %:    Foot reading %:    CHD:      Immunizations  Most Recent Immunizations   Administered Date(s) Administered   • None   Pended Date(s) Pended   • Hepatitis B Immune Globulin 2021   Deferred Date(s) Deferred   • Hep B, adolescent or  pediatric 2021       Circumcision    Pending    Car Seat Screen    Not Indicated    ASSESSMENT:  Well 1 day old male infant.           There is no problem list on file for this patient.   No problem-specific Assessment & Plan notes found for this encounter.      Early onset sepsis screen:  Early onset sepsis screen:     Sepsis Risk Calculator Data      Admission (Current) from 2021 in [facility] 3rd floor Well Baby [facility]   Gestational age (weeks)  41   Gestational age (days)  1   Highest maternal antepartum temp (F)  102.5   ROM (hours)  16.37   Maternal GBS status  negative   Type of intrapartum antibiotics  No antibiotics or any antibiotics less than 2 hrs prior to birth          Risk at Birth: 6.12  Risk - Well Appearin.52  Risk - Equivocal: 29.88  Risk - Clinical Illness: 115.51    Clinical Exam:  Well Appearing (no persistent physiologic abnormalities)    Plan for EOS  - EOS Risk at Birth: Greater or equal to 1 per 1,000 live births and < 3 per 1,000 live births and well appearing - Vital Signs q 4h for 24h  - Blood culture and CBC on admission - Yes  - No Antibiotics was initiated due to low risk of Sepsis - But will initiate later, if clinically indicated.    PLAN: Routine care.   To room-in with mother.  Normal [place] [facility] with Pediatrician    Discharge Disposition: Plan Home With Mom in 2-3 days  Pediatrician after discharge: TBD    Routine [place] [facility]  1. Anticipatory guidance provided for parents at bedside.  2. Breastfeed ad isaac. Lactation consultation as needed.  3. Hepatitis B vaccine ordered.  4. Hearing screen prior to discharge.  5.  Screen to be done at 24 hours  6. Bilirubin at 24 hours   7. Cardiac Screen prior to discharge  8. PCP: Will discuss with mother regarding making appt with pediatrician 1-4 days after discharge.  9. Spoken to RN and reviewed findings and plan of care    I have reviewed infants current condition and plan of management with Mother at the bedside.      Luis Miguel Anna MD    2021 10:17 AM

## 2022-04-09 NOTE — ED PROVIDER NOTE - PHYSICAL EXAMINATION
GEN: Patient awake alert NAD.   HEENT: normocephalic, atraumatic, EOMI, no scleral icterus, moist MM  CARDIAC: RRR, S1, S2, no murmur.   PULM: CTA B/L + very mild exp wheeze, no rhonchi, no rales.   ABD: soft NT, ND, no rebound no guarding  MSK: Moving all extremities, no edema. 5/5 strength and full ROM in all extremities.     NEURO: A&Ox3, gait normal, no focal neurological deficits, CN 2-12 grossly intact  SKIN: warm, dry, no rash.

## 2022-04-09 NOTE — ED ADULT TRIAGE NOTE - CHIEF COMPLAINT QUOTE
Pt states "I have nasal congestion and my throat feels tight." Pt states nasal congestion started 1 week ago after spending time with her grandson who was also sick. States throat tightness started today. Pt states she is coughing because of the throat tightness. Denies fever, chills, chest pain, shortness of breath, N/V/D.

## 2022-04-09 NOTE — ED PROVIDER NOTE - OBJECTIVE STATEMENT
57 yO F hx of hypothyroid, pw 1 day non productive cough. 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 No fever chills, no CP, speaks in full sentences, spo2 100% on RA.

## 2022-04-09 NOTE — ED PROVIDER NOTE - NS ED ROS FT
GENERAL: No fever, chills  EYES: no vision changes, no discharge.   ENT: no difficulty swallowing or speaking   CARDIAC: no chest pain/pressure, SOB, lower extremity swelling  PULMONARY: +cough, no SOB  GI: no abdominal pain, n/v/d  : no dysuria, no hematuria  SKIN: no rashes, no ecchymosis  NEURO: no headache, lightheadedness  MSK: No joint pain, myalgia, weakness.

## 2022-04-09 NOTE — ED PROVIDER NOTE - NSFOLLOWUPINSTRUCTIONS_ED_ALL_ED_FT
** Take over the counter Tylenol or Ibuprofen for pain -- follow dosing directions on original bottle.    ** get lots of rest and stay hydrated.   ** Go to the nearest Emergency Department if you experience any new or concerning symptoms, such as:   - chest pain  - difficulty breathing  - passing out  - unable to eat or drink  - unable to move or feel part of your body  - fever > 100.4 that does not go down with tylenol.

## 2022-04-09 NOTE — ED ADULT NURSE NOTE - OBJECTIVE STATEMENT
Patient presents to ED with complaint of nasal congestion, sensation of throat tightness and cough since last night. No signs or symptoms of SOB, discomfort, pain, nausea, vomiting, dizziness. Alert and oriented x 4.

## 2022-04-09 NOTE — ED PROVIDER NOTE - PATIENT PORTAL LINK FT
You can access the FollowMyHealth Patient Portal offered by [facility] by registering at the following website: [email or web address]. By joining [employer]’s FollowMyHealth portal, you will also be able to view your health information using other applications (apps) compatible with our system.

## 2022-05-21 NOTE — ED PROVIDER NOTE - WR INTERPRETATION 1
Progress Note    Patient: Washington Zapata MRN: [id number]  SSN: [id number]    YOB: 1964  Age: 63 y.o. Sex: male      Admit Date: 2022    2 Days Post-Op    Procedure:  Procedure(s):  ESOPHAGOGASTRODUODENOSCOPY (EGD) with Botox injection  INJECTION  ESOPHAGOGASTRODUODENAL (EGD) BIOPSY    Subjective:     Patient patient had vomiting earlier. NG tube now placed. He is feeling much better. Objective:     Visit Vitals  /81 (BP 1 Location: Left upper arm, BP Patient Position: Supine)   Pulse 79   Temp 98.3 °F (36.8 °C)   Resp 19   Ht 6' 2\" (1.88 m)   Wt 78.2 kg (172 lb 8 oz)   SpO2 94%   BMI 22.15 kg/m²       Temp (24hrs), Av.1 °F (36.7 °C), Min:97.7 °F (36.5 °C), Max:98.4 °F (36.9 °C)      Physical Exam:    General alert and oriented no acute distress  Lungs clear bilaterally  Heart regular rate rhythm  Abdomen soft nontender nondistended  NG tube aspirate dark    Data Review: images and reports reviewed    Lab Review: All lab results for the last 24 hours reviewed.   Recent Results (from the past 24 hour(s))   GLUCOSE, POC    Collection Time: 22 12:28 AM   Result Value Ref Range    Glucose (POC) 137 (H) 65 - 117 mg/dL    Performed by [facility]    CBC W/O DIFF    Collection Time: 22  4:34 AM   Result Value Ref Range    WBC 10.5 4.1 - 11.1 K/uL    RBC 3.70 (L) 4.10 - 5.70 M/uL    HGB 10.9 (L) 12.1 - 17.0 g/dL    HCT 34.1 (L) 36.6 - 50.3 %    MCV 92.2 80.0 - 99.0 FL    MCH 29.5 26.0 - 34.0 PG    MCHC 32.0 30.0 - 36.5 g/dL    RDW 13.9 11.5 - 14.5 %    PLATELET 722 938 - 973 K/uL    MPV 9.7 8.9 - 12.9 FL    NRBC 0.0 0  WBC    ABSOLUTE NRBC 0.00 0.00 - 5.68 K/uL   METABOLIC PANEL, COMPREHENSIVE    Collection Time: 22  4:34 AM   Result Value Ref Range    Sodium 134 (L) 136 - 145 mmol/L    Potassium 4.5 3.5 - 5.1 mmol/L    Chloride 103 97 - 108 mmol/L    CO2 24 21 - 32 mmol/L    Anion gap 7 5 - 15 mmol/L    Glucose 466 (H) 65 - 100 mg/dL    BUN 19 6 - 20 MG/DL Creatinine 1.07 0.70 - 1.30 MG/DL    BUN/Creatinine ratio 18 12 - 20      GFR est AA >60 >60 ml/min/1.73m2    GFR est non-AA >60 >60 ml/min/1.73m2    Calcium 8.6 8.5 - 10.1 MG/DL    Bilirubin, total 0.9 0.2 - 1.0 MG/DL    ALT (SGPT) 10 (L) 12 - 78 U/L    AST (SGOT) 8 (L) 15 - 37 U/L    Alk. phosphatase 55 45 - 117 U/L    Protein, total 5.8 (L) 6.4 - 8.2 g/dL    Albumin 2.3 (L) 3.5 - 5.0 g/dL    Globulin 3.5 2.0 - 4.0 g/dL    A-G Ratio 0.7 (L) 1.1 - 2.2     MAGNESIUM    Collection Time: 05/21/22  4:34 AM   Result Value Ref Range    Magnesium 2.0 1.6 - 2.4 mg/dL   PHOSPHORUS    Collection Time: 05/21/22  4:34 AM   Result Value Ref Range    Phosphorus 3.3 2.6 - 4.7 MG/DL   TRIGLYCERIDE    Collection Time: 05/21/22  4:34 AM   Result Value Ref Range    Triglyceride 132 <150 MG/DL   GLUCOSE, POC    Collection Time: 05/21/22  5:53 AM   Result Value Ref Range    Glucose (POC) 135 (H) 65 - 117 mg/dL    Performed by [address] [place] [address]:     Hospital Problems  Date Reviewed: 5/16/2022          Codes Class Noted POA    Gastric outlet obstruction ICD-10-CM: K31.1  ICD-9-CM: 537.0  5/19/2022 Unknown              Plan/Recommendations/Medical Decision Making:   Gastric outlet obstruction-feeling somewhat better with NG tube in place. Continue that for now. Chloraseptic spray for throat discomfort. Out of bed and ambulate. Await final pathology. Continue TPN. CXR negative - No infiltrates, No consolidation, No atelectasis seen

## 2022-05-26 NOTE — ED ADULT NURSE NOTE - NSFALLRSKUNASSIST_ED_ALL_ED
Ms. Neelam Dumont is a 69 y.o. y/o female with history of DVT, PE, Afib   She presents today for anticoagulation monitoring and adjustment. Pertinent PMH: HTN, Gastric Banding,CAD, diskectomy with an artifical spinal disk implant in September 2012. Patient Reported Findings:  Yes     No  [x]   []       Patient verifies current dosing regimen as listed  []   [x]       S/S bleeding/bruising/swelling/SOB states that she has been wheezing more and had more SOB d/t allergies --> has had a few small nose bleeds recently  --> denies  []   [x]       Blood in urine or stool  []   [x]       Procedures scheduled in the future at this time none upcoming    []   [x]       Missed Dose denies  []   [x]       Extra Dose denies   []   [x]       Change in medications  decreased tylenol intake to 1-2 times a day --> still taking propafenone 150 BID --> no changes    [x]   []       Change in health/diet/appetite  Patient states that she feels like she has returned to normal vit k intake --> diet fluctuates causing INR to fluctuate.  Discussed ways to improve consistency with vit k intake  --> states that she has cut back spinach and cabbage intake to twice a week --> no vit k --> had cabbage a few times --> no vit k since was on vacation but plans to return --> has been eating a salad a day-->no changes --> had less vit k acutely -->has been trying to eat more vit k, salads, broccoli  []   [x]       Change in alcohol use    []   [x]       Change in activity  []   [x]       Hospital admission  []   [x]       Emergency department visit   []   [x]       Other complaints     Clinical Outcomes:  Yes     No  []   [x]       Major bleeding event  []   [x]       Thromboembolic event  Gets warfarin through MD    Duration of warfarin Therapy: indefinite  INR Range:  2.0-3.0     INR is 2.2 today   Continue weekly dose of 5 mg Mon and Fri and 7.5 mg all other days of the week   Encouraged to maintain a consistency of vegetables/salads.   Recheck INR in 6 weeks, 7/7    Does not need PW printed, only card     Patient consent signed 10/28/21    Referring PCP is Christiano Torres  INR (no units)   Date Value   04/14/2022 3.1   03/03/2022 2.5   01/25/2022 2.3   10/05/2021 2.50 (H)   05/06/2021 2.80 (H)   10/26/2020 2.90 (H)      For Pharmacy Admin Tracking Only     Total # of Interventions Recommended: 0   Total # of Interventions Accepted: 0   Time Spent (min): 15 no

## 2022-07-15 ENCOUNTER — EMERGENCY (EMERGENCY)
Facility: HOSPITAL | Age: 57
LOS: 1 days | Discharge: HOME | End: 2022-07-15
Attending: STUDENT IN AN ORGANIZED HEALTH CARE EDUCATION/TRAINING PROGRAM | Admitting: STUDENT IN AN ORGANIZED HEALTH CARE EDUCATION/TRAINING PROGRAM
Payer: MEDICAID

## 2022-07-15 VITALS
SYSTOLIC BLOOD PRESSURE: 143 MMHG | HEART RATE: 87 BPM | OXYGEN SATURATION: 97 % | TEMPERATURE: 98 F | RESPIRATION RATE: 18 BRPM | DIASTOLIC BLOOD PRESSURE: 83 MMHG

## 2022-07-15 VITALS
HEART RATE: 98 BPM | RESPIRATION RATE: 18 BRPM | SYSTOLIC BLOOD PRESSURE: 166 MMHG | DIASTOLIC BLOOD PRESSURE: 89 MMHG | OXYGEN SATURATION: 99 % | TEMPERATURE: 98 F | HEIGHT: 60 IN | WEIGHT: 190.04 LBS

## 2022-07-15 DIAGNOSIS — Z98.89 OTHER SPECIFIED POSTPROCEDURAL STATES: Chronic | ICD-10-CM

## 2022-07-15 DIAGNOSIS — Z98.51 TUBAL LIGATION STATUS: Chronic | ICD-10-CM

## 2022-07-15 LAB
ALBUMIN SERPL ELPH-MCNC: 3.4 G/DL — SIGNIFICANT CHANGE UP (ref 3.3–5)
ALP SERPL-CCNC: 93 U/L — SIGNIFICANT CHANGE UP (ref 40–120)
ALT FLD-CCNC: 26 U/L — SIGNIFICANT CHANGE UP (ref 10–45)
ANION GAP SERPL CALC-SCNC: 6 MMOL/L — SIGNIFICANT CHANGE UP (ref 5–17)
AST SERPL-CCNC: 27 U/L — SIGNIFICANT CHANGE UP (ref 10–40)
BILIRUB SERPL-MCNC: 1 MG/DL — SIGNIFICANT CHANGE UP (ref 0.2–1.2)
BUN SERPL-MCNC: 13 MG/DL — SIGNIFICANT CHANGE UP (ref 7–23)
CALCIUM SERPL-MCNC: 9 MG/DL — SIGNIFICANT CHANGE UP (ref 8.4–10.5)
CHLORIDE SERPL-SCNC: 107 MMOL/L — SIGNIFICANT CHANGE UP (ref 96–108)
CO2 SERPL-SCNC: 27 MMOL/L — SIGNIFICANT CHANGE UP (ref 22–31)
CREAT SERPL-MCNC: 0.6 MG/DL — SIGNIFICANT CHANGE UP (ref 0.5–1.3)
EGFR: 105 ML/MIN/1.73M2 — SIGNIFICANT CHANGE UP
GLUCOSE SERPL-MCNC: 162 MG/DL — HIGH (ref 70–99)
HCT VFR BLD CALC: 44.3 % — SIGNIFICANT CHANGE UP (ref 34.5–45)
HGB BLD-MCNC: 13.8 G/DL — SIGNIFICANT CHANGE UP (ref 11.5–15.5)
MCHC RBC-ENTMCNC: 25.2 PG — LOW (ref 27–34)
MCHC RBC-ENTMCNC: 31.2 GM/DL — LOW (ref 32–36)
MCV RBC AUTO: 80.8 FL — SIGNIFICANT CHANGE UP (ref 80–100)
NRBC # BLD: 0 /100 WBCS — SIGNIFICANT CHANGE UP (ref 0–0)
PLATELET # BLD AUTO: 219 K/UL — SIGNIFICANT CHANGE UP (ref 150–400)
POTASSIUM SERPL-MCNC: 4.5 MMOL/L — SIGNIFICANT CHANGE UP (ref 3.5–5.3)
POTASSIUM SERPL-SCNC: 4.5 MMOL/L — SIGNIFICANT CHANGE UP (ref 3.5–5.3)
PROT SERPL-MCNC: 7.5 G/DL — SIGNIFICANT CHANGE UP (ref 6–8.3)
RBC # BLD: 5.48 M/UL — HIGH (ref 3.8–5.2)
RBC # FLD: 14.5 % — SIGNIFICANT CHANGE UP (ref 10.3–14.5)
SARS-COV-2 RNA SPEC QL NAA+PROBE: SIGNIFICANT CHANGE UP
SODIUM SERPL-SCNC: 140 MMOL/L — SIGNIFICANT CHANGE UP (ref 135–145)
TROPONIN I, HIGH SENSITIVITY RESULT: <4 NG/L — SIGNIFICANT CHANGE UP
WBC # BLD: 10.48 K/UL — SIGNIFICANT CHANGE UP (ref 3.8–10.5)
WBC # FLD AUTO: 10.48 K/UL — SIGNIFICANT CHANGE UP (ref 3.8–10.5)

## 2022-07-15 PROCEDURE — 96360 HYDRATION IV INFUSION INIT: CPT

## 2022-07-15 PROCEDURE — 99285 EMERGENCY DEPT VISIT HI MDM: CPT

## 2022-07-15 PROCEDURE — 36415 COLL VENOUS BLD VENIPUNCTURE: CPT

## 2022-07-15 PROCEDURE — 94640 AIRWAY INHALATION TREATMENT: CPT

## 2022-07-15 PROCEDURE — 71046 X-RAY EXAM CHEST 2 VIEWS: CPT | Mod: 26

## 2022-07-15 PROCEDURE — 85027 COMPLETE CBC AUTOMATED: CPT

## 2022-07-15 PROCEDURE — 93010 ELECTROCARDIOGRAM REPORT: CPT

## 2022-07-15 PROCEDURE — 93005 ELECTROCARDIOGRAM TRACING: CPT

## 2022-07-15 PROCEDURE — 87635 SARS-COV-2 COVID-19 AMP PRB: CPT

## 2022-07-15 PROCEDURE — 80053 COMPREHEN METABOLIC PANEL: CPT

## 2022-07-15 PROCEDURE — 84484 ASSAY OF TROPONIN QUANT: CPT

## 2022-07-15 PROCEDURE — 99285 EMERGENCY DEPT VISIT HI MDM: CPT | Mod: 25

## 2022-07-15 PROCEDURE — 71046 X-RAY EXAM CHEST 2 VIEWS: CPT

## 2022-07-15 RX ORDER — AMOXICILLIN 250 MG/5ML
1000 SUSPENSION, RECONSTITUTED, ORAL (ML) ORAL ONCE
Refills: 0 | Status: DISCONTINUED | OUTPATIENT
Start: 2022-07-15 | End: 2022-07-15

## 2022-07-15 RX ORDER — SODIUM CHLORIDE 9 MG/ML
1000 INJECTION INTRAMUSCULAR; INTRAVENOUS; SUBCUTANEOUS ONCE
Refills: 0 | Status: COMPLETED | OUTPATIENT
Start: 2022-07-15 | End: 2022-07-15

## 2022-07-15 RX ORDER — ALBUTEROL 90 UG/1
2.5 AEROSOL, METERED ORAL ONCE
Refills: 0 | Status: COMPLETED | OUTPATIENT
Start: 2022-07-15 | End: 2022-07-15

## 2022-07-15 RX ORDER — DEXAMETHASONE 0.5 MG/5ML
10 ELIXIR ORAL ONCE
Refills: 0 | Status: COMPLETED | OUTPATIENT
Start: 2022-07-15 | End: 2022-07-15

## 2022-07-15 RX ORDER — DIPHENHYDRAMINE HCL 50 MG
25 CAPSULE ORAL ONCE
Refills: 0 | Status: COMPLETED | OUTPATIENT
Start: 2022-07-15 | End: 2022-07-15

## 2022-07-15 RX ORDER — ALBUTEROL 90 UG/1
3 AEROSOL, METERED ORAL
Qty: 30 | Refills: 0
Start: 2022-07-15

## 2022-07-15 RX ADMIN — ALBUTEROL 2.5 MILLIGRAM(S): 90 AEROSOL, METERED ORAL at 06:16

## 2022-07-15 RX ADMIN — Medication 10 MILLIGRAM(S): at 02:32

## 2022-07-15 RX ADMIN — Medication 25 MILLIGRAM(S): at 02:08

## 2022-07-15 RX ADMIN — SODIUM CHLORIDE 1000 MILLILITER(S): 9 INJECTION INTRAMUSCULAR; INTRAVENOUS; SUBCUTANEOUS at 06:19

## 2022-07-15 RX ADMIN — SODIUM CHLORIDE 1000 MILLILITER(S): 9 INJECTION INTRAMUSCULAR; INTRAVENOUS; SUBCUTANEOUS at 05:23

## 2022-07-15 RX ADMIN — ALBUTEROL 2.5 MILLIGRAM(S): 90 AEROSOL, METERED ORAL at 02:08

## 2022-07-15 RX ADMIN — Medication 100 MILLIGRAM(S): at 02:31

## 2022-07-15 NOTE — ED PROVIDER NOTE - ENMT, MLM
Airway patent, Nasal mucosa clear. Mouth with normal mucosa. Throat has no vesicles, no oropharyngeal exudates and uvula is midline. Oropharynx wnl with no lip, tongue or uvular swelling. Breathing comfortably on RA, no stridor, speaking normal voice, tolerating secretions.

## 2022-07-15 NOTE — ED PROVIDER NOTE - CLINICAL SUMMARY MEDICAL DECISION MAKING FREE TEXT BOX
Cough, throat irritation likely 2/2 viral URI. Cough, throat irritation likely 2/2 viral URI. Oropharynx wnl. Lungs CTA b/l. Breathing comfortably on RA. Normal vitals.  Exam not consistent with anaphylaxis, allergic reaction. Labs unremarkble. EKG NSR with no acute ischemic changes. CXR with no consolidation or PTX on my wet read. Became lightheaded after benadryl. Improved with fluids. COugh, throat irritation improved after albuterol, benadryl, robitussin. Doubt emergent cardiopulmonary or upper airway pathology. Stable for dc. 57F with hx HTN presents with 1 day of cough, throat irritation. Cough, throat irritation likely 2/2 viral URI. Oropharynx wnl. Lungs CTA b/l. Breathing comfortably on RA. Normal vitals.  Exam not consistent with anaphylaxis, allergic reaction. Labs unremarkble. EKG NSR with no acute ischemic changes. CXR with no consolidation or PTX on my wet read. Became lightheaded after benadryl. Improved with fluids. COugh, throat irritation improved after albuterol, benadryl, robitussin. Doubt emergent cardiopulmonary or upper airway pathology. Stable for dc.

## 2022-07-15 NOTE — ED ADULT TRIAGE NOTE - CHIEF COMPLAINT QUOTE
Presents from home for sudden onset of SOB with associated cough. Pt. reports known sick contact grandchild with PNA. Pt. states her throat was bothering her earlier and now she keeps coughing and is having trouble breathing. Denies allergies or ingesting anything new prior to episode. Per pt. this has happened once before ~yr. ago but unsure of dx. or tx. at this moment. SpO2 99% on room air in triage.

## 2022-07-15 NOTE — ED ADULT NURSE REASSESSMENT NOTE - NS ED NURSE REASSESS COMMENT FT1
Pt. called RN to bedside. MD also present at bedside. Pt. requests second nebulizer treatment. Cough subsided but now has restarted though not as frequent or bothersome as initial encounter. Neb to be ordered by MD. Safety maintained. Will continue to monitor.

## 2022-07-15 NOTE — ED PROVIDER NOTE - PATIENT PORTAL LINK FT
You can access the FollowMyHealth Patient Portal offered by [facility] by registering at the following website: [email or web address]. By joining [employer]’s FollowMyHealth portal, you will also be able to view your health information using other applications (apps) compatible with our system.

## 2022-07-15 NOTE — ED PROVIDER NOTE - NSFOLLOWUPINSTRUCTIONS_ED_ALL_ED_FT
Your cough is most likely due to a viral upper respiratory infection. You can try cough syrup such as Robitussin. Trial albuterol every 4 hours as needed. Follow up with primary care doctor this week. Return to the ED for fever, shortness of breath, any other concerning symptoms.      Acute Cough    WHAT YOU NEED TO KNOW:    An acute cough can last up to 3 weeks. Common causes of an acute cough include a cold, allergies, or a lung infection.    DISCHARGE INSTRUCTIONS:    Return to the emergency department if:   •You have trouble breathing or feel short of breath.      •You cough up blood, or you see blood in your mucus.      •You faint or feel weak or dizzy.      •You have chest pain when you cough or take a deep breath.      •You have new wheezing.      Contact your healthcare provider if:   •You have a fever.      •Your cough lasts longer than 4 weeks.      •Your symptoms do not improve with treatment.      •You have questions or concerns about your condition or care.      Medicines:   •Medicines may be needed to stop the cough, decrease swelling in your airways, or help open your airways. Medicine may also be given to help you cough up mucus. Ask your healthcare provider what over-the-counter medicines you can take. If you have an infection caused by bacteria, you may need antibiotics.      •Take your medicine as directed. Contact your healthcare provider if you think your medicine is not helping or if you have side effects. Tell him or her if you are allergic to any medicine. Keep a list of the medicines, vitamins, and herbs you take. Include the amounts, and when and why you take them. Bring the list or the pill bottles to follow-up visits. Carry your medicine list with you in case of an emergency.      Manage your symptoms:   •Do not smoke and stay away from others who smoke. Nicotine and other chemicals in cigarettes and cigars can cause lung damage and make your cough worse. Ask your healthcare provider for information if you currently smoke and need help to quit. E-cigarettes or smokeless tobacco still contain nicotine. Talk to your healthcare provider before you use these products.      •Drink extra liquids as directed. Liquids will help thin and loosen mucus so you can cough it up. Liquids will also help prevent dehydration. Examples of good liquids to drink include water, fruit juice, and broth. Do not drink liquids that contain caffeine. Caffeine can increase your risk for dehydration. Ask your healthcare provider how much liquid to drink each day.      •Rest as directed. Do not do activities that make your cough worse, such as exercise.      •Use a humidifier or vaporizer. Use a cool mist humidifier or a vaporizer to increase air moisture in your home. This may make it easier for you to breathe and help decrease your cough.      •Eat 2 to 5 mL of honey 2 times each day. Honey can help thin mucus and decrease your cough.      •Use cough drops or lozenges. These can help decrease throat irritation and your cough.      Follow up with your healthcare provider as directed: Write down your questions so you remember to ask them during your visits.       © Copyright [employer] 2022           back to top                          © Copyright [employer] 2022

## 2022-07-26 ENCOUNTER — APPOINTMENT (OUTPATIENT)
Dept: RADIOLOGY | Facility: HOSPITAL | Age: 57
End: 2022-07-26

## 2022-07-26 ENCOUNTER — OUTPATIENT (OUTPATIENT)
Dept: OUTPATIENT SERVICES | Facility: HOSPITAL | Age: 57
LOS: 1 days | End: 2022-07-26
Payer: MEDICAID

## 2022-07-26 DIAGNOSIS — Z98.51 TUBAL LIGATION STATUS: Chronic | ICD-10-CM

## 2022-07-26 DIAGNOSIS — Z98.89 OTHER SPECIFIED POSTPROCEDURAL STATES: Chronic | ICD-10-CM

## 2022-07-26 DIAGNOSIS — Z00.8 ENCOUNTER FOR OTHER GENERAL EXAMINATION: ICD-10-CM

## 2022-07-26 PROCEDURE — 71046 X-RAY EXAM CHEST 2 VIEWS: CPT

## 2022-07-26 PROCEDURE — 71046 X-RAY EXAM CHEST 2 VIEWS: CPT | Mod: 26

## 2022-08-31 ENCOUNTER — APPOINTMENT (OUTPATIENT)
Dept: NEUROLOGY | Facility: CLINIC | Age: 57
End: 2022-08-31

## 2022-09-08 ENCOUNTER — APPOINTMENT (OUTPATIENT)
Dept: PULMONOLOGY | Facility: CLINIC | Age: 57
End: 2022-09-08

## 2022-10-17 ENCOUNTER — OUTPATIENT (OUTPATIENT)
Dept: OUTPATIENT SERVICES | Facility: HOSPITAL | Age: 57
LOS: 1 days | End: 2022-10-17
Payer: MEDICAID

## 2022-10-17 ENCOUNTER — APPOINTMENT (OUTPATIENT)
Dept: MAMMOGRAPHY | Facility: HOSPITAL | Age: 57
End: 2022-10-17

## 2022-10-17 DIAGNOSIS — Z98.89 OTHER SPECIFIED POSTPROCEDURAL STATES: Chronic | ICD-10-CM

## 2022-10-17 DIAGNOSIS — Z98.51 TUBAL LIGATION STATUS: Chronic | ICD-10-CM

## 2022-10-17 DIAGNOSIS — Z00.8 ENCOUNTER FOR OTHER GENERAL EXAMINATION: ICD-10-CM

## 2022-10-17 PROCEDURE — 77063 BREAST TOMOSYNTHESIS BI: CPT | Mod: 26

## 2022-10-17 PROCEDURE — 77067 SCR MAMMO BI INCL CAD: CPT | Mod: 26

## 2022-10-17 PROCEDURE — 77063 BREAST TOMOSYNTHESIS BI: CPT

## 2022-10-17 PROCEDURE — 77067 SCR MAMMO BI INCL CAD: CPT

## 2022-10-31 ENCOUNTER — APPOINTMENT (OUTPATIENT)
Dept: NEUROLOGY | Facility: CLINIC | Age: 57
End: 2022-10-31

## 2022-11-18 ENCOUNTER — APPOINTMENT (OUTPATIENT)
Dept: ULTRASOUND IMAGING | Facility: HOSPITAL | Age: 57
End: 2022-11-18

## 2023-02-17 ENCOUNTER — NON-APPOINTMENT (OUTPATIENT)
Age: 58
End: 2023-02-17

## 2023-02-27 ENCOUNTER — OUTPATIENT (OUTPATIENT)
Dept: OUTPATIENT SERVICES | Facility: HOSPITAL | Age: 58
LOS: 1 days | Discharge: HOME | End: 2023-02-27

## 2023-02-27 DIAGNOSIS — Z98.89 OTHER SPECIFIED POSTPROCEDURAL STATES: Chronic | ICD-10-CM

## 2023-02-27 DIAGNOSIS — D64.9 ANEMIA, UNSPECIFIED: ICD-10-CM

## 2023-02-27 DIAGNOSIS — Z98.51 TUBAL LIGATION STATUS: Chronic | ICD-10-CM

## 2023-03-06 ENCOUNTER — APPOINTMENT (OUTPATIENT)
Dept: HEMATOLOGY ONCOLOGY | Facility: CLINIC | Age: 58
End: 2023-03-06

## 2023-03-17 ENCOUNTER — TRANSCRIPTION ENCOUNTER (OUTPATIENT)
Age: 58
End: 2023-03-17

## 2023-07-06 ENCOUNTER — APPOINTMENT (OUTPATIENT)
Dept: OPHTHALMOLOGY | Facility: CLINIC | Age: 58
End: 2023-07-06

## 2023-07-11 ENCOUNTER — APPOINTMENT (OUTPATIENT)
Dept: ORTHOPEDIC SURGERY | Facility: CLINIC | Age: 58
End: 2023-07-11
Payer: MEDICAID

## 2023-07-11 DIAGNOSIS — M17.12 UNILATERAL PRIMARY OSTEOARTHRITIS, LEFT KNEE: ICD-10-CM

## 2023-07-11 PROCEDURE — 73564 X-RAY EXAM KNEE 4 OR MORE: CPT | Mod: 50

## 2023-07-11 PROCEDURE — 20610 DRAIN/INJ JOINT/BURSA W/O US: CPT | Mod: LT

## 2023-07-11 PROCEDURE — 99203 OFFICE O/P NEW LOW 30 MIN: CPT | Mod: 25

## 2023-07-11 NOTE — DISCUSSION/SUMMARY
[de-identified] : The underlying pathophysiology was reviewed with the patient. XR films were reviewed with the patient. Discussed at length the nature of the patient’s condition. The left knee symptoms are secondary to advanced medial compartment arthritis. \par \par At this time, I reviewed her xrays with her in great detail. I explained to her that she does have severe arthritis medially at the left knee, which is the source of her pain. We discussed treatment options of symptomatic treatment with oral and topical antiinflammatories, cortisone injection versus gel injection, physical therapy, and ultimately TKR if indicated. I told her that I would first recommend exhausting all conservative options prior to surgery. She opted for a cortisone injection at the left knee as well as a referral to PT in lieu of other discussed treatment options.\par \par The patient wishes to proceed with a cortisone injection today. Under sterile precautions, an injection of 5cc 1% lidocaine without epinephrine and 0.5cc Kenalog 40mg, 0.5cc Dexamethasone was administered into the LEFT knee (2). The patient tolerated the procedure well. \par \par The patient wishes to proceed with physical therapy of the left knee (ROM and strengthening). A script was given.\par \par All questions answered, understanding verbalized. Patient in agreement with plan of care. Follow up as needed.

## 2023-07-11 NOTE — END OF VISIT
[FreeTextEntry3] : All medical record entries made by the Scribe were at my,  Dr. Mohinder Wesley MD., direction and personally dictated by me on 07/11/2023. I have personally reviewed the chart and agree that the record accurately reflects my personal performance of the history, physical exam, assessment and plan.

## 2023-07-11 NOTE — ADDENDUM
[FreeTextEntry1] : I, Amrita Wells wrote this note acting as a scribe for Dr. Mohinder Wesley on Jul 11, 2023.

## 2023-07-11 NOTE — HISTORY OF PRESENT ILLNESS
[de-identified] : Pt is a 59 y/o female with left knee pain. She denies injury. She notes increased symptoms at the left knee when walking. She has taken Advil, without much relief. She had a prior cortisone injection at the left knee, about 4 years ago, with relief at the time. She notes a family history of arthritis, on her mother's side.

## 2023-07-11 NOTE — PHYSICAL EXAM
[de-identified] : Patient is WDWN, alert, and in no acute distress. Breathing is unlabored. She is grossly oriented to person, place, and time.\par \par Left Knee:\par There is medial joint line tenderness to palpation. No lateral joint line tenderness. Mild swelling, no varus or valgus instability present on provocative testing. There is a varus deformity noted. \par Range of motion: Active flexion and extension are full and painless. No crepitus.\par Tests and Signs: All tests for stability are normal. \par Strength: flexion and extension 5/5  [de-identified] : AP, lateral, skyline, and tunnel views of the BILATERAL knees were obtained today and revealed bone on bone arthritis at the medial compartment of the left knee.

## 2023-10-17 ENCOUNTER — OUTPATIENT (OUTPATIENT)
Dept: OUTPATIENT SERVICES | Facility: HOSPITAL | Age: 58
LOS: 1 days | Discharge: HOME | End: 2023-10-17

## 2023-10-17 DIAGNOSIS — Z98.89 OTHER SPECIFIED POSTPROCEDURAL STATES: Chronic | ICD-10-CM

## 2023-10-17 DIAGNOSIS — D64.9 ANEMIA, UNSPECIFIED: ICD-10-CM

## 2023-10-17 DIAGNOSIS — Z98.51 TUBAL LIGATION STATUS: Chronic | ICD-10-CM

## 2023-10-19 ENCOUNTER — APPOINTMENT (OUTPATIENT)
Dept: HEMATOLOGY ONCOLOGY | Facility: CLINIC | Age: 58
End: 2023-10-19
Payer: MEDICAID

## 2023-10-19 ENCOUNTER — NON-APPOINTMENT (OUTPATIENT)
Age: 58
End: 2023-10-19

## 2023-10-19 VITALS
HEART RATE: 70 BPM | BODY MASS INDEX: 39.58 KG/M2 | SYSTOLIC BLOOD PRESSURE: 121 MMHG | TEMPERATURE: 97.4 F | DIASTOLIC BLOOD PRESSURE: 69 MMHG | RESPIRATION RATE: 18 BRPM | OXYGEN SATURATION: 97 % | HEIGHT: 60 IN | WEIGHT: 201.59 LBS

## 2023-10-19 DIAGNOSIS — D56.3 THALASSEMIA MINOR: ICD-10-CM

## 2023-10-19 DIAGNOSIS — D72.829 ELEVATED WHITE BLOOD CELL COUNT, UNSPECIFIED: ICD-10-CM

## 2023-10-19 DIAGNOSIS — D75.1 SECONDARY POLYCYTHEMIA: ICD-10-CM

## 2023-10-19 DIAGNOSIS — Z82.69 FAMILY HISTORY OF OTHER DISEASES OF THE MUSCULOSKELETAL SYSTEM AND CONNECTIVE TISSUE: ICD-10-CM

## 2023-10-19 PROCEDURE — 99204 OFFICE O/P NEW MOD 45 MIN: CPT

## 2023-10-19 RX ORDER — LORATADINE 10 MG/1
10 TABLET ORAL
Qty: 15 | Refills: 0 | Status: DISCONTINUED | COMMUNITY
Start: 2017-05-22 | End: 2023-10-19

## 2023-10-19 RX ORDER — BENZONATATE 200 MG/1
200 CAPSULE ORAL
Qty: 15 | Refills: 0 | Status: DISCONTINUED | COMMUNITY
Start: 2017-05-22 | End: 2023-10-19

## 2023-10-19 RX ORDER — LEVOTHYROXINE SODIUM 0.17 MG/1
175 TABLET ORAL
Qty: 30 | Refills: 0 | Status: DISCONTINUED | COMMUNITY
Start: 2017-06-12 | End: 2023-10-19

## 2023-10-29 PROBLEM — D75.1 ERYTHROCYTOSIS: Status: ACTIVE | Noted: 2023-10-19

## 2023-10-29 PROBLEM — Z82.69 FAMILY HISTORY OF SCLERODERMA: Status: ACTIVE | Noted: 2023-10-29

## 2023-10-29 PROBLEM — D72.829 LEUKOCYTOSIS, UNSPECIFIED TYPE: Status: ACTIVE | Noted: 2023-10-19

## 2023-10-29 RX ORDER — METOPROLOL TARTRATE 50 MG/1
50 TABLET, FILM COATED ORAL
Qty: 120 | Refills: 0 | Status: DISCONTINUED | COMMUNITY
Start: 2019-03-19 | End: 2023-10-29

## 2023-11-02 ENCOUNTER — NON-APPOINTMENT (OUTPATIENT)
Age: 58
End: 2023-11-02

## 2024-01-08 ENCOUNTER — APPOINTMENT (OUTPATIENT)
Dept: RADIOLOGY | Facility: HOSPITAL | Age: 59
End: 2024-01-08
Payer: MEDICAID

## 2024-01-08 ENCOUNTER — OUTPATIENT (OUTPATIENT)
Dept: OUTPATIENT SERVICES | Facility: HOSPITAL | Age: 59
LOS: 1 days | End: 2024-01-08
Payer: MEDICAID

## 2024-01-08 DIAGNOSIS — Z98.89 OTHER SPECIFIED POSTPROCEDURAL STATES: Chronic | ICD-10-CM

## 2024-01-08 DIAGNOSIS — Z98.51 TUBAL LIGATION STATUS: Chronic | ICD-10-CM

## 2024-01-08 DIAGNOSIS — Z00.8 ENCOUNTER FOR OTHER GENERAL EXAMINATION: ICD-10-CM

## 2024-01-08 PROCEDURE — 71046 X-RAY EXAM CHEST 2 VIEWS: CPT | Mod: 26

## 2024-01-08 PROCEDURE — 71046 X-RAY EXAM CHEST 2 VIEWS: CPT

## 2024-02-12 ENCOUNTER — OUTPATIENT (OUTPATIENT)
Dept: OUTPATIENT SERVICES | Facility: HOSPITAL | Age: 59
LOS: 1 days | End: 2024-02-12
Payer: MEDICAID

## 2024-02-12 ENCOUNTER — APPOINTMENT (OUTPATIENT)
Dept: MAMMOGRAPHY | Facility: HOSPITAL | Age: 59
End: 2024-02-12
Payer: MEDICAID

## 2024-02-12 DIAGNOSIS — Z98.51 TUBAL LIGATION STATUS: Chronic | ICD-10-CM

## 2024-02-12 DIAGNOSIS — Z98.89 OTHER SPECIFIED POSTPROCEDURAL STATES: Chronic | ICD-10-CM

## 2024-02-12 DIAGNOSIS — Z00.8 ENCOUNTER FOR OTHER GENERAL EXAMINATION: ICD-10-CM

## 2024-02-12 PROCEDURE — 77063 BREAST TOMOSYNTHESIS BI: CPT

## 2024-02-12 PROCEDURE — 77067 SCR MAMMO BI INCL CAD: CPT | Mod: 26

## 2024-02-12 PROCEDURE — 77063 BREAST TOMOSYNTHESIS BI: CPT | Mod: 26

## 2024-02-12 PROCEDURE — 77067 SCR MAMMO BI INCL CAD: CPT

## 2024-02-19 ENCOUNTER — APPOINTMENT (OUTPATIENT)
Dept: RADIOLOGY | Facility: HOSPITAL | Age: 59
End: 2024-02-19
Payer: MEDICAID

## 2024-02-19 ENCOUNTER — OUTPATIENT (OUTPATIENT)
Dept: OUTPATIENT SERVICES | Facility: HOSPITAL | Age: 59
LOS: 1 days | End: 2024-02-19
Payer: MEDICAID

## 2024-02-19 DIAGNOSIS — Z98.51 TUBAL LIGATION STATUS: Chronic | ICD-10-CM

## 2024-02-19 DIAGNOSIS — Z00.8 ENCOUNTER FOR OTHER GENERAL EXAMINATION: ICD-10-CM

## 2024-02-19 DIAGNOSIS — Z98.89 OTHER SPECIFIED POSTPROCEDURAL STATES: Chronic | ICD-10-CM

## 2024-02-19 PROCEDURE — 77080 DXA BONE DENSITY AXIAL: CPT

## 2024-02-19 PROCEDURE — 77080 DXA BONE DENSITY AXIAL: CPT | Mod: 26

## 2024-04-04 ENCOUNTER — APPOINTMENT (OUTPATIENT)
Dept: CARDIOLOGY | Facility: CLINIC | Age: 59
End: 2024-04-04

## 2024-04-18 ENCOUNTER — APPOINTMENT (OUTPATIENT)
Dept: CARDIOLOGY | Facility: CLINIC | Age: 59
End: 2024-04-18
Payer: MEDICAID

## 2024-04-18 ENCOUNTER — NON-APPOINTMENT (OUTPATIENT)
Age: 59
End: 2024-04-18

## 2024-04-18 VITALS
HEART RATE: 71 BPM | HEIGHT: 60 IN | BODY MASS INDEX: 40.84 KG/M2 | DIASTOLIC BLOOD PRESSURE: 73 MMHG | SYSTOLIC BLOOD PRESSURE: 120 MMHG | WEIGHT: 208 LBS | OXYGEN SATURATION: 98 %

## 2024-04-18 DIAGNOSIS — E66.01 MORBID (SEVERE) OBESITY DUE TO EXCESS CALORIES: ICD-10-CM

## 2024-04-18 PROCEDURE — 93242 EXT ECG>48HR<7D RECORDING: CPT

## 2024-04-18 PROCEDURE — 99204 OFFICE O/P NEW MOD 45 MIN: CPT | Mod: 25

## 2024-04-18 PROCEDURE — 93000 ELECTROCARDIOGRAM COMPLETE: CPT | Mod: 59

## 2024-04-18 NOTE — ASSESSMENT
[FreeTextEntry1] : dr branch did not do labs this year will see physical labs-  get labs-   ebvent monitor echo

## 2024-04-18 NOTE — HISTORY OF PRESENT ILLNESS
[FreeTextEntry1] : Karie Noyola hypothyroidisnm and htn taking atenol not taking metoprool synthroid 175  atenolol 25 mg daily for htn   palpitations at night due to sons condition no cp on exertion no sob noo syncope palp lasts seconds - hears heart pumping some coffee at night- 1 cup,   check bp at home 120-130

## 2024-04-18 NOTE — FAMILY HISTORY
[TextEntry] : mother- scleroderma father- dm brother- healthy no mi  clotting disorder- in  mothers side

## 2024-04-19 PROBLEM — E66.01 OBESITY, MORBID, BMI 40.0-49.9: Status: ACTIVE | Noted: 2024-04-19

## 2024-04-19 RX ORDER — ATENOLOL 25 MG/1
25 TABLET ORAL
Refills: 0 | Status: ACTIVE | COMMUNITY

## 2024-04-19 RX ORDER — METOPROLOL SUCCINATE 50 MG/1
50 TABLET, EXTENDED RELEASE ORAL DAILY
Refills: 0 | Status: DISCONTINUED | COMMUNITY
End: 2024-04-19

## 2024-04-26 ENCOUNTER — NON-APPOINTMENT (OUTPATIENT)
Age: 59
End: 2024-04-26

## 2024-04-26 LAB
ALBUMIN SERPL ELPH-MCNC: 4.2 G/DL
ALP BLD-CCNC: 88 U/L
ALT SERPL-CCNC: 22 U/L
ANION GAP SERPL CALC-SCNC: 11 MMOL/L
AST SERPL-CCNC: 17 U/L
BASOPHILS # BLD AUTO: 0.04 K/UL
BASOPHILS NFR BLD AUTO: 0.5 %
BILIRUB SERPL-MCNC: 0.9 MG/DL
BUN SERPL-MCNC: 12 MG/DL
CALCIUM SERPL-MCNC: 9.5 MG/DL
CHLORIDE SERPL-SCNC: 105 MMOL/L
CHOLEST SERPL-MCNC: 155 MG/DL
CO2 SERPL-SCNC: 23 MMOL/L
CREAT SERPL-MCNC: 0.51 MG/DL
DEPRECATED D DIMER PPP IA-ACNC: <150 NG/ML DDU
EGFR: 109 ML/MIN/1.73M2
EOSINOPHIL # BLD AUTO: 0.17 K/UL
EOSINOPHIL NFR BLD AUTO: 1.9 %
ESTIMATED AVERAGE GLUCOSE: 131 MG/DL
GLUCOSE SERPL-MCNC: 118 MG/DL
HBA1C MFR BLD HPLC: 6.2 %
HCT VFR BLD CALC: 44 %
HDLC SERPL-MCNC: 33 MG/DL
HGB BLD-MCNC: 13.9 G/DL
IMM GRANULOCYTES NFR BLD AUTO: 0.7 %
LDLC SERPL CALC-MCNC: 96 MG/DL
LYMPHOCYTES # BLD AUTO: 3.23 K/UL
LYMPHOCYTES NFR BLD AUTO: 36.7 %
MAGNESIUM SERPL-MCNC: 1.8 MG/DL
MAN DIFF?: NORMAL
MCHC RBC-ENTMCNC: 25.5 PG
MCHC RBC-ENTMCNC: 31.6 GM/DL
MCV RBC AUTO: 80.7 FL
MONOCYTES # BLD AUTO: 0.52 K/UL
MONOCYTES NFR BLD AUTO: 5.9 %
NEUTROPHILS # BLD AUTO: 4.78 K/UL
NEUTROPHILS NFR BLD AUTO: 54.3 %
NONHDLC SERPL-MCNC: 122 MG/DL
PLATELET # BLD AUTO: 234 K/UL
POTASSIUM SERPL-SCNC: 4.2 MMOL/L
PROT SERPL-MCNC: 6.7 G/DL
RBC # BLD: 5.45 M/UL
RBC # FLD: 14.2 %
SODIUM SERPL-SCNC: 140 MMOL/L
TRIGL SERPL-MCNC: 148 MG/DL
WBC # FLD AUTO: 8.8 K/UL

## 2024-05-02 PROCEDURE — 93244 EXT ECG>48HR<7D REV&INTERPJ: CPT

## 2024-05-03 ENCOUNTER — APPOINTMENT (OUTPATIENT)
Dept: ENDOCRINOLOGY | Facility: CLINIC | Age: 59
End: 2024-05-03

## 2024-06-14 ENCOUNTER — APPOINTMENT (OUTPATIENT)
Dept: CARDIOLOGY | Facility: CLINIC | Age: 59
End: 2024-06-14

## 2024-06-18 ENCOUNTER — EMERGENCY (EMERGENCY)
Facility: HOSPITAL | Age: 59
LOS: 1 days | Discharge: HOME | End: 2024-06-18
Attending: INTERNAL MEDICINE | Admitting: INTERNAL MEDICINE
Payer: MEDICAID

## 2024-06-18 VITALS
SYSTOLIC BLOOD PRESSURE: 166 MMHG | TEMPERATURE: 99 F | HEART RATE: 71 BPM | DIASTOLIC BLOOD PRESSURE: 70 MMHG | OXYGEN SATURATION: 96 % | WEIGHT: 190.04 LBS | RESPIRATION RATE: 18 BRPM | HEIGHT: 61 IN

## 2024-06-18 VITALS
HEART RATE: 78 BPM | RESPIRATION RATE: 16 BRPM | OXYGEN SATURATION: 98 % | DIASTOLIC BLOOD PRESSURE: 62 MMHG | SYSTOLIC BLOOD PRESSURE: 135 MMHG

## 2024-06-18 DIAGNOSIS — Z98.51 TUBAL LIGATION STATUS: Chronic | ICD-10-CM

## 2024-06-18 DIAGNOSIS — Z98.89 OTHER SPECIFIED POSTPROCEDURAL STATES: Chronic | ICD-10-CM

## 2024-06-18 LAB
ALBUMIN SERPL ELPH-MCNC: 3.5 G/DL — SIGNIFICANT CHANGE UP (ref 3.3–5)
ALP SERPL-CCNC: 86 U/L — SIGNIFICANT CHANGE UP (ref 40–120)
ALT FLD-CCNC: 29 U/L — SIGNIFICANT CHANGE UP (ref 10–45)
ANION GAP SERPL CALC-SCNC: 6 MMOL/L — SIGNIFICANT CHANGE UP (ref 5–17)
AST SERPL-CCNC: 19 U/L — SIGNIFICANT CHANGE UP (ref 10–40)
BASOPHILS # BLD AUTO: 0.05 K/UL — SIGNIFICANT CHANGE UP (ref 0–0.2)
BASOPHILS NFR BLD AUTO: 0.4 % — SIGNIFICANT CHANGE UP (ref 0–2)
BILIRUB SERPL-MCNC: 0.8 MG/DL — SIGNIFICANT CHANGE UP (ref 0.2–1.2)
BUN SERPL-MCNC: 16 MG/DL — SIGNIFICANT CHANGE UP (ref 7–23)
CALCIUM SERPL-MCNC: 9.2 MG/DL — SIGNIFICANT CHANGE UP (ref 8.4–10.5)
CHLORIDE SERPL-SCNC: 104 MMOL/L — SIGNIFICANT CHANGE UP (ref 96–108)
CO2 SERPL-SCNC: 30 MMOL/L — SIGNIFICANT CHANGE UP (ref 22–31)
CREAT SERPL-MCNC: 0.73 MG/DL — SIGNIFICANT CHANGE UP (ref 0.5–1.3)
EGFR: 95 ML/MIN/1.73M2 — SIGNIFICANT CHANGE UP
EOSINOPHIL # BLD AUTO: 0.16 K/UL — SIGNIFICANT CHANGE UP (ref 0–0.5)
EOSINOPHIL NFR BLD AUTO: 1.3 % — SIGNIFICANT CHANGE UP (ref 0–6)
GLUCOSE SERPL-MCNC: 126 MG/DL — HIGH (ref 70–99)
HCT VFR BLD CALC: 42.4 % — SIGNIFICANT CHANGE UP (ref 34.5–45)
HGB BLD-MCNC: 13.7 G/DL — SIGNIFICANT CHANGE UP (ref 11.5–15.5)
IMM GRANULOCYTES NFR BLD AUTO: 0.5 % — SIGNIFICANT CHANGE UP (ref 0–0.9)
LYMPHOCYTES # BLD AUTO: 3.9 K/UL — HIGH (ref 1–3.3)
LYMPHOCYTES # BLD AUTO: 32.1 % — SIGNIFICANT CHANGE UP (ref 13–44)
MCHC RBC-ENTMCNC: 25.5 PG — LOW (ref 27–34)
MCHC RBC-ENTMCNC: 32.3 GM/DL — SIGNIFICANT CHANGE UP (ref 32–36)
MCV RBC AUTO: 79 FL — LOW (ref 80–100)
MONOCYTES # BLD AUTO: 0.72 K/UL — SIGNIFICANT CHANGE UP (ref 0–0.9)
MONOCYTES NFR BLD AUTO: 5.9 % — SIGNIFICANT CHANGE UP (ref 2–14)
NEUTROPHILS # BLD AUTO: 7.26 K/UL — SIGNIFICANT CHANGE UP (ref 1.8–7.4)
NEUTROPHILS NFR BLD AUTO: 59.8 % — SIGNIFICANT CHANGE UP (ref 43–77)
NRBC # BLD: 0 /100 WBCS — SIGNIFICANT CHANGE UP (ref 0–0)
NT-PROBNP SERPL-SCNC: 94 PG/ML — SIGNIFICANT CHANGE UP (ref 0–300)
PLATELET # BLD AUTO: 242 K/UL — SIGNIFICANT CHANGE UP (ref 150–400)
POTASSIUM SERPL-MCNC: 4 MMOL/L — SIGNIFICANT CHANGE UP (ref 3.5–5.3)
POTASSIUM SERPL-SCNC: 4 MMOL/L — SIGNIFICANT CHANGE UP (ref 3.5–5.3)
PROT SERPL-MCNC: 7.6 G/DL — SIGNIFICANT CHANGE UP (ref 6–8.3)
RBC # BLD: 5.37 M/UL — HIGH (ref 3.8–5.2)
RBC # FLD: 14.2 % — SIGNIFICANT CHANGE UP (ref 10.3–14.5)
SODIUM SERPL-SCNC: 140 MMOL/L — SIGNIFICANT CHANGE UP (ref 135–145)
TROPONIN I, HIGH SENSITIVITY RESULT: <4 NG/L — SIGNIFICANT CHANGE UP
WBC # BLD: 12.15 K/UL — HIGH (ref 3.8–10.5)
WBC # FLD AUTO: 12.15 K/UL — HIGH (ref 3.8–10.5)

## 2024-06-18 PROCEDURE — 83880 ASSAY OF NATRIURETIC PEPTIDE: CPT

## 2024-06-18 PROCEDURE — 36415 COLL VENOUS BLD VENIPUNCTURE: CPT

## 2024-06-18 PROCEDURE — 93010 ELECTROCARDIOGRAM REPORT: CPT

## 2024-06-18 PROCEDURE — 80053 COMPREHEN METABOLIC PANEL: CPT

## 2024-06-18 PROCEDURE — 70450 CT HEAD/BRAIN W/O DYE: CPT | Mod: MC

## 2024-06-18 PROCEDURE — 99285 EMERGENCY DEPT VISIT HI MDM: CPT | Mod: 25

## 2024-06-18 PROCEDURE — 71045 X-RAY EXAM CHEST 1 VIEW: CPT

## 2024-06-18 PROCEDURE — 99285 EMERGENCY DEPT VISIT HI MDM: CPT

## 2024-06-18 PROCEDURE — 93005 ELECTROCARDIOGRAM TRACING: CPT

## 2024-06-18 PROCEDURE — 93971 EXTREMITY STUDY: CPT

## 2024-06-18 PROCEDURE — 71045 X-RAY EXAM CHEST 1 VIEW: CPT | Mod: 26

## 2024-06-18 PROCEDURE — 70450 CT HEAD/BRAIN W/O DYE: CPT | Mod: 26,MC

## 2024-06-18 PROCEDURE — 84484 ASSAY OF TROPONIN QUANT: CPT

## 2024-06-18 PROCEDURE — 85025 COMPLETE CBC W/AUTO DIFF WBC: CPT

## 2024-06-18 PROCEDURE — 93971 EXTREMITY STUDY: CPT | Mod: 26,LT

## 2024-06-18 RX ORDER — MECLIZINE HCL 12.5 MG
1 TABLET ORAL
Qty: 40 | Refills: 0
Start: 2024-06-18 | End: 2024-06-27

## 2024-06-18 RX ORDER — MECLIZINE HCL 12.5 MG
50 TABLET ORAL ONCE
Refills: 0 | Status: COMPLETED | OUTPATIENT
Start: 2024-06-18 | End: 2024-06-18

## 2024-06-18 RX ADMIN — Medication 50 MILLIGRAM(S): at 18:09

## 2024-06-18 NOTE — ED PROVIDER NOTE - NSFOLLOWUPINSTRUCTIONS_ED_ALL_ED_FT
Follow up with your PMD within 1-2 days.  Rest, increase your fluids, advance your activity as tolerated.   Take all of your other medications as previously prescribed.   Worsening, continued or ANY new concerning symptoms return to the emergency department.  Meclizine 25 mg every 6 hours as needed for dizziness, follow-up with Dr. Gonzalez,

## 2024-06-18 NOTE — ED PROVIDER NOTE - CARE PLAN
Principal Discharge DX:	Cerebellar lesion  Secondary Diagnosis:	Left leg pain  Secondary Diagnosis:	Dizzinesses   1

## 2024-06-18 NOTE — ED ADULT NURSE NOTE - NSFALLRISKINTERV_ED_ALL_ED

## 2024-06-18 NOTE — ED PROVIDER NOTE - PHYSICAL EXAMINATION
General:     NAD, well-nourished, well-appearing  Head:     NC/AT, EOMI, oral mucosa moist  Positive [place]-Hallpike test  Neck:     trachea midline  Lungs:     CTA b/l, no w/r/r  CVS:     S1S2, RRR, no m/g/r  Abd:     +BS, s/nt/nd, no organomegaly  Ext:    2+ radial and pedal pulses, no c/c/e  Neuro: AAOx3, no sensory/motor deficits  Unsteady gait patient able to balance ambulate independently

## 2024-06-18 NOTE — ED PROVIDER NOTE - OBJECTIVE STATEMENT
58-year-old female came to the emergency room chief complaint of weakness in the left leg for more than 1 year patient also complaining of dizziness intermittent for few months patient was sent to the emergency room for further evaluation Patient also had a left leg swollen and painful and was also recommended DVT study Patient has been using over-the-counter meclizine with no relief

## 2024-06-18 NOTE — ED ADULT TRIAGE NOTE - CHIEF COMPLAINT QUOTE
Patient complaint of dizziness for the past two days. Denies any fall, hitting her head or any LOC. Patient also complaint of heaviness in her legs.

## 2024-06-18 NOTE — ED PROVIDER NOTE - PATIENT PORTAL LINK FT
You can access the FollowMyHealth Patient Portal offered by [facility] by registering at the following website: [email or web address]. By joining [employer]’s FollowMyHealth portal, you will also be able to view your health information using other applications (apps) compatible with our system.

## 2024-06-18 NOTE — ED ADULT NURSE NOTE - OBJECTIVE STATEMENT
Pt. to ED complaining of dizziness and weakness for 2 days.  Pt. denies any falls or injuries.  Pt. denies any chest pain or shortness of breath.  Skin warm and dry.

## 2024-06-18 NOTE — ED ADULT NURSE NOTE - NS ED NURSE LEVEL OF CONSCIOUSNESS ORIENTATION
Oriented - self; Oriented - place; Oriented - time Non-Graft Cartilage Fenestration Text: The cartilage was fenestrated with a 2mm punch biopsy to help facilitate healing.

## 2024-06-20 ENCOUNTER — APPOINTMENT (OUTPATIENT)
Dept: CARDIOLOGY | Facility: CLINIC | Age: 59
End: 2024-06-20
Payer: MEDICAID

## 2024-06-20 ENCOUNTER — APPOINTMENT (OUTPATIENT)
Dept: ENDOCRINOLOGY | Facility: CLINIC | Age: 59
End: 2024-06-20
Payer: MEDICAID

## 2024-06-20 ENCOUNTER — NON-APPOINTMENT (OUTPATIENT)
Age: 59
End: 2024-06-20

## 2024-06-20 VITALS
OXYGEN SATURATION: 97 % | SYSTOLIC BLOOD PRESSURE: 138 MMHG | WEIGHT: 198 LBS | HEART RATE: 82 BPM | RESPIRATION RATE: 16 BRPM | DIASTOLIC BLOOD PRESSURE: 74 MMHG | HEIGHT: 60 IN | BODY MASS INDEX: 38.87 KG/M2

## 2024-06-20 VITALS
RESPIRATION RATE: 17 BRPM | TEMPERATURE: 97.6 F | HEART RATE: 59 BPM | SYSTOLIC BLOOD PRESSURE: 140 MMHG | DIASTOLIC BLOOD PRESSURE: 72 MMHG | BODY MASS INDEX: 38.87 KG/M2 | OXYGEN SATURATION: 97 % | HEIGHT: 60 IN | WEIGHT: 198 LBS

## 2024-06-20 DIAGNOSIS — R00.2 PALPITATIONS: ICD-10-CM

## 2024-06-20 DIAGNOSIS — I10 ESSENTIAL (PRIMARY) HYPERTENSION: ICD-10-CM

## 2024-06-20 DIAGNOSIS — E03.9 HYPOTHYROIDISM, UNSPECIFIED: ICD-10-CM

## 2024-06-20 DIAGNOSIS — R73.03 PREDIABETES.: ICD-10-CM

## 2024-06-20 PROCEDURE — 93000 ELECTROCARDIOGRAM COMPLETE: CPT

## 2024-06-20 PROCEDURE — 99214 OFFICE O/P EST MOD 30 MIN: CPT | Mod: 25

## 2024-06-20 PROCEDURE — 99204 OFFICE O/P NEW MOD 45 MIN: CPT

## 2024-06-20 NOTE — ASSESSMENT
[FreeTextEntry1] : Target: TSH in the normal range for the testing lab   Last TSH was above goal - I am rechecking her TSH level today.  Lab testing for celiac disease done in 2019 was negative.  The patient has prediabetes - she is to improve her diet and exercise habits to prevent the progression to DM.  There is a diagnosis of thalassemia trait in the chart but the Hb electrophoresis done in October 2023 was normal  Last TSH - April 2024 - 11 (elevated) Last HbA1c - 04/22/2024 - 6.2%  Plan: 1. Continue levothyroxine (generic) 175 micrograms po daily  2. Labs to be done today - see below 3. Follow up in 3 days to review results - telehealth visit ok

## 2024-06-20 NOTE — HISTORY OF PRESENT ILLNESS
[FreeTextEntry1] : Problems: 1. Primary hypothyroidism 2. Prediabetes  There is a diagnosis of thalassemia trait in the chart but the Hb electrophoresis done in October 2023 was normal  Primary hypothyroidism 1. Diagnosed with primary hypothyroidism in 2004 - patient never had thyroid surgery/radiation/radioactive iodine therapy 04/22/2024 - TSH 11 (elevated) Feb 2024 - TPO antibodies neg, thyroglobulin antibodies neg 2. Labs: 2019 - testing for celiac disease - IgA antibody level not seen, Transglutaminase IgA neg, Endomysial IgA neg, Gliadin Deaminated IgG and IgA neg so patient does not have celiac disease 3. Grandmother and daughter have hypothyroidism, no family history of thyroid cancer, no personal history of radiation treatment 4. Meds: Levothyroxine (generic) 175 micrograms po daily - fully compliant and patient advised on the appropriate use of levothyroxine  Prediabetes 1. Diagnosed since at least 2017 2. Labs: 04/22/2024 - HbA1c 6.2%

## 2024-06-20 NOTE — PHYSICAL EXAM
[de-identified] : General: No distress, well nourished Eyes: Normal Sclera, EOMI, PERRL ENT: Normal appearance of the nose, normal oropharynx Neck/Thyroid: No cervical lymphadenopathy, thyroid gland 20 g in size, no thyroid nodules, non-tender Respiratory: No use of accessory muscles of respiration, vesicular breath sounds heard bilaterally, no crepitations or ronchi Cardiovascular: S1 and S2 heard and normal, no S3 or S4, no murmurs, radial pulse normal bilaterally Abdomen: soft, non-tender, no masses, normal bowel sounds Musculoskeletal: No swelling or deformities of joints of hands, no pedal edema Neurological: Normal range of motion in the hands, Normal brachioradialis reflexes bilaterally Psychiatry: Patient converses normally, good judgement and insight Skin: No rashes in hands, no nodules palpated in hands

## 2024-06-21 LAB
ALBUMIN SERPL ELPH-MCNC: 4.7 G/DL
ALP BLD-CCNC: 97 U/L
ALT SERPL-CCNC: 23 U/L
ANION GAP SERPL CALC-SCNC: 15 MMOL/L
AST SERPL-CCNC: 15 U/L
BILIRUB SERPL-MCNC: 0.9 MG/DL
BUN SERPL-MCNC: 16 MG/DL
CALCIUM SERPL-MCNC: 9.8 MG/DL
CHLORIDE SERPL-SCNC: 101 MMOL/L
CO2 SERPL-SCNC: 24 MMOL/L
CREAT SERPL-MCNC: 0.55 MG/DL
EGFR: 107 ML/MIN/1.73M2
ESTIMATED AVERAGE GLUCOSE: 131 MG/DL
FRUCTOSAMINE SERPL-MCNC: 253 UMOL/L
GLUCOSE SERPL-MCNC: 104 MG/DL
HBA1C MFR BLD HPLC: 6.2 %
POTASSIUM SERPL-SCNC: 4.5 MMOL/L
PROT SERPL-MCNC: 7.8 G/DL
SODIUM SERPL-SCNC: 140 MMOL/L
TSH SERPL-ACNC: 5.71 UIU/ML

## 2024-06-22 NOTE — REVIEW OF SYSTEMS
[Under Stress] : under stress [Headache] : no headache [Blurry Vision] : no blurred vision [SOB] : no shortness of breath [Chest Discomfort] : no chest discomfort [Lower Ext Edema] : no extremity edema [Palpitations] : no palpitations [Syncope] : no syncope [Cough] : no cough [Abdominal Pain] : no abdominal pain [Joint Pain] : no joint pain [Rash] : no rash [Dizziness] : dizziness [Easy Bruising] : no tendency for easy bruising

## 2024-06-22 NOTE — HISTORY OF PRESENT ILLNESS
[FreeTextEntry1] : Karie Noyoal is a 58 year old woman with past medical history of Hypertension and Hypothyroidism who presents for follow up visit.  The patient reports that since her last visit she was evaluated in the ER for dizziness. She denies recurrent palpitations, denies chest pain or shortness of breath.

## 2024-06-22 NOTE — CARDIOLOGY SUMMARY
[de-identified] : ECG (4/18/24): normal sinus rhythm ECG (6/20/24): normal sinus rhythm, left axis deviation [de-identified] : Cardiac Event Monitor (4/2024): Normal sinus rhythm. One SVT (4 beats). [de-identified] : TTE (2021): Normal LV & RV systolic function

## 2024-06-22 NOTE — ASSESSMENT
[FreeTextEntry1] : Assessment: Karie Noyola is a 58 year old woman with past medical history of Hypertension and Hypothyroidism who presents for follow up visit.  The patient reports that since her last visit she was evaluated in the ER for dizziness. She denies recurrent palpitations, denies chest pain or shortness of breath. ECG today consistent with normal sinus rhythm and left axis deviation. ER labs (6/2024) consistent with CBC, CMP within normal limits and negative troponin. BP stable. Recent cardiac event monitor (4/2024) consistent with predominantly normal sinus rhythm and one brief episode of SVT. Prior echocardiogram report (2021) consistent with normal LV and RV systolic function.   Recommendations: [] Palpitations: No etiology based on recent labs. One brief SVT noted on cardiac event monitor, however patient with no recurrent palpitations at this time so will not start medication, will monitor symptoms. Awaiting echocardiogram to ensure no structural heart disease.  Patient recommended to decrease caffeine intake and increase water intake.  [] Hypertension: BP stable, patient reports that she takes Atenolol 25 mg daily, may continue this. Recommend DASH diet plan with weight loss. [] Risk stratification: LDL 96 mg/dl (4/2024) with 10 yr ASCVD risk score of 3.5% is low risk, continue lifestyle modifications with low cholesterol diet, low sugar intake and aerobic exercise regimen.  [] Return to office: 2 months

## 2024-06-22 NOTE — PHYSICAL EXAM
[Normal Conjunctiva] : normal conjunctiva [Normal] : alert and oriented, normal memory [de-identified] : well appearing [de-identified] : supple [de-identified] : JVP ~ 7 cm H20, RRR, s1, s2, no murmurs [de-identified] : unlabored respirations, clear lung fields [de-identified] : obese

## 2024-06-25 ENCOUNTER — APPOINTMENT (OUTPATIENT)
Dept: ORTHOPEDIC SURGERY | Facility: CLINIC | Age: 59
End: 2024-06-25

## 2024-07-01 ENCOUNTER — NON-APPOINTMENT (OUTPATIENT)
Age: 59
End: 2024-07-01

## 2024-07-01 ENCOUNTER — APPOINTMENT (OUTPATIENT)
Dept: ENDOCRINOLOGY | Facility: CLINIC | Age: 59
End: 2024-07-01

## 2024-07-16 ENCOUNTER — APPOINTMENT (OUTPATIENT)
Dept: ENDOCRINOLOGY | Facility: CLINIC | Age: 59
End: 2024-07-16
Payer: MEDICAID

## 2024-07-16 DIAGNOSIS — R73.03 PREDIABETES.: ICD-10-CM

## 2024-07-16 DIAGNOSIS — E03.9 HYPOTHYROIDISM, UNSPECIFIED: ICD-10-CM

## 2024-07-16 PROCEDURE — 99214 OFFICE O/P EST MOD 30 MIN: CPT

## 2024-07-18 ENCOUNTER — APPOINTMENT (OUTPATIENT)
Dept: CARDIOLOGY | Facility: CLINIC | Age: 59
End: 2024-07-18
Payer: MEDICAID

## 2024-07-18 PROCEDURE — 93306 TTE W/DOPPLER COMPLETE: CPT

## 2024-07-30 ENCOUNTER — APPOINTMENT (OUTPATIENT)
Dept: ORTHOPEDIC SURGERY | Facility: CLINIC | Age: 59
End: 2024-07-30
Payer: MEDICAID

## 2024-07-30 VITALS — BODY MASS INDEX: 38.87 KG/M2 | WEIGHT: 198 LBS | HEIGHT: 60 IN

## 2024-07-30 DIAGNOSIS — M25.562 PAIN IN LEFT KNEE: ICD-10-CM

## 2024-07-30 PROCEDURE — 99213 OFFICE O/P EST LOW 20 MIN: CPT

## 2024-07-30 NOTE — HISTORY OF PRESENT ILLNESS
[de-identified] : The patient is a 59-year-old right-hand dominant female presenting with complaints of right knee pain. She reports no specific injury to the knee. The pain is described as moderate and has been persistent. She had an x-ray done today

## 2024-07-30 NOTE — DISCUSSION/SUMMARY
[de-identified] : The underlying pathophysiology was reviewed with the patient. XR films were reviewed with the patient, showing mild to moderate medial osteoarthritis of the right knee. The patient's symptoms are consistent with the imaging findings.  The patient was advised to take OTC pain medications as needed and to apply ice to the affected area to reduce pain and inflammation. Physical therapy prescribed for strengthening and stretching.   All questions answered, understanding verbalized. Patient in agreement with plan of care. Patient will follow up as needed.

## 2024-07-30 NOTE — PHYSICAL EXAM
[de-identified] :  Patient is WDWN, alert, and in no acute distress. Breathing is unlabored. She is grossly oriented to person, place, and time.  Right Knee: medial joint line tenderness +medial Annette's +locking Full ROM  Pain with full extension  Medial buckling [de-identified] : AP, lateral, and skyline views of the right knee were obtained today and revealed mild to moderate medial osteoarthritis.

## 2024-07-30 NOTE — END OF VISIT
[FreeTextEntry4] :  I, KARIE MCDONALD wrote this note acting as a scribe for Dr. Mohinder Wesley on Jul 30, 2024.

## 2024-08-13 ENCOUNTER — APPOINTMENT (OUTPATIENT)
Dept: ORTHOPEDIC SURGERY | Facility: CLINIC | Age: 59
End: 2024-08-13

## 2024-08-15 NOTE — DISCUSSION/SUMMARY
[de-identified] : The underlying pathophysiology was reviewed with the patient. XR films were reviewed with the patient, showing mild to moderate medial osteoarthritis of the right knee.   The patient was advised to take OTC pain medications as needed and to apply ice to the affected area to reduce pain and inflammation.    All questions answered, understanding verbalized. Patient in agreement with plan of care.  If the patient begins to experience any changes or severe exacerbation of her symptoms, she should reach out to me as soon as possible. Otherwise, she should return to me as needed.

## 2024-08-15 NOTE — DISCUSSION/SUMMARY
[de-identified] : The underlying pathophysiology was reviewed with the patient. XR films were reviewed with the patient, showing mild to moderate medial osteoarthritis of the right knee.   The patient was advised to take OTC pain medications as needed and to apply ice to the affected area to reduce pain and inflammation.    All questions answered, understanding verbalized. Patient in agreement with plan of care.  If the patient begins to experience any changes or severe exacerbation of her symptoms, she should reach out to me as soon as possible. Otherwise, she should return to me as needed.

## 2024-08-15 NOTE — HISTORY OF PRESENT ILLNESS
[de-identified] : The patient is a 59-year-old right-hand dominant female presenting with complaints of right knee pain. She reports no specific injury to the knee. The pain is described as moderate and has been persistent.   Today, Aug 13, 2024, the patient presents for follow-up and further management.

## 2024-08-15 NOTE — PHYSICAL EXAM
[de-identified] :  Patient is WDWN, alert, and in no acute distress. Breathing is unlabored. She is grossly oriented to person, place, and time.  Right Knee: medial joint line tenderness +medial Annette's +locking Full ROM  Pain with full extension  Medial buckling [de-identified] : AP, lateral, and skyline views of the right knee were obtained today and revealed mild to moderate medial osteoarthritis.

## 2024-08-15 NOTE — HISTORY OF PRESENT ILLNESS
[de-identified] : The patient is a 59-year-old right-hand dominant female presenting with complaints of right knee pain. She reports no specific injury to the knee. The pain is described as moderate and has been persistent.   Today, Aug 13, 2024, the patient presents for follow-up and further management.

## 2024-08-15 NOTE — PHYSICAL EXAM
[de-identified] :  Patient is WDWN, alert, and in no acute distress. Breathing is unlabored. She is grossly oriented to person, place, and time.  Right Knee: medial joint line tenderness +medial Annette's +locking Full ROM  Pain with full extension  Medial buckling [de-identified] : AP, lateral, and skyline views of the right knee were obtained today and revealed mild to moderate medial osteoarthritis.

## 2024-08-20 ENCOUNTER — APPOINTMENT (OUTPATIENT)
Dept: ORTHOPEDIC SURGERY | Facility: CLINIC | Age: 59
End: 2024-08-20

## 2024-08-26 ENCOUNTER — NON-APPOINTMENT (OUTPATIENT)
Age: 59
End: 2024-08-26

## 2024-08-27 ENCOUNTER — APPOINTMENT (OUTPATIENT)
Dept: NEUROLOGY | Facility: CLINIC | Age: 59
End: 2024-08-27
Payer: MEDICAID

## 2024-08-27 VITALS
HEIGHT: 60 IN | BODY MASS INDEX: 38.87 KG/M2 | WEIGHT: 198 LBS | HEART RATE: 85 BPM | OXYGEN SATURATION: 99 % | SYSTOLIC BLOOD PRESSURE: 135 MMHG | DIASTOLIC BLOOD PRESSURE: 72 MMHG | TEMPERATURE: 97.9 F

## 2024-08-27 DIAGNOSIS — G47.9 SLEEP DISORDER, UNSPECIFIED: ICD-10-CM

## 2024-08-27 DIAGNOSIS — R90.89 OTHER ABNORMAL FINDINGS ON DIAGNOSTIC IMAGING OF CENTRAL NERVOUS SYSTEM: ICD-10-CM

## 2024-08-27 PROCEDURE — 99204 OFFICE O/P NEW MOD 45 MIN: CPT

## 2024-08-27 PROCEDURE — G2211 COMPLEX E/M VISIT ADD ON: CPT | Mod: NC

## 2024-08-27 RX ORDER — ALPRAZOLAM 0.25 MG/1
0.25 TABLET ORAL
Qty: 2 | Refills: 0 | Status: ACTIVE | COMMUNITY
Start: 2024-08-27 | End: 1900-01-01

## 2024-08-27 NOTE — ASSESSMENT
[FreeTextEntry1] : Impression: This 59-year-old female patient presented to the emergency room on 6/18/2024 with complaint of dizziness.  She had a CAT scan of the brain performed at that time which was unchanged from her prior scan of 2016 revealing prominent occipital bone arachnoid granulations and potential posterior cerebellar encephalocele.  Today's neurological exam does not reveal any definite findings of posterior fossa dysfunction.  The patient describes an occasional awakening from sleep with dizziness and tachycardia.  There is a history of hypertension and hypothyroidism and anxiety.  Recommendations: MRI of the brain with and without contrast.  Consider referral for sleep study.  Office follow-up.

## 2024-08-27 NOTE — HISTORY OF PRESENT ILLNESS
[FreeTextEntry1] : This patient is seen for an office consultation.  She is a 59-year-old female who was seen in the [place] emergency room on 6/18/2024 because of dizziness which did ultimately resolved.  She denies vertigo.  She will occasionally awaken from sleep with dizziness and tachycardia and is concerned about anxiety.  CAT scan of the brain performed on 6/18/2024 did reveal no acute findings and there was no change from 2016.  There was prominent occipital bone arachnoid granulations and potentially with posterior cerebellar encephalocele.  The findings were similar to 2016.  Neurology consult and MRI were considered warranted.  Past history significant for hypothyroidism and hypertension for which she takes Synthroid and atenolol.  She denies significant headache syncope seizure double vision dysarthria problems with gait or balance.  She does have a history possibly consistent with learning disability.  Her son has been seen by me as a patient and he does have a learning disability.

## 2024-08-27 NOTE — PHYSICAL EXAM
[FreeTextEntry1] : Head:  Normocephalic Neck: Supple nontender no carotid bruits.   Mental Status:  Alert Oriented X3 Speech normal and no aphasia or dysarthria.  Cranial Nerves:  PERRL, Fundi not visualized.  Visual Fields full  EOMI no diplopia no ptosis no nystagmus, V through XII intact.  Motor:  No drift, normal strength tone and coordination and no focal atrophy. No abnormal movements. No dysmetria.  Normal rapid alternating movements.   DTRs: Symmetric 1-2+.  Plantars withdrawal.  Sensory:  Normal testing with pin light touch and position sense.  Gait: Unsteady tandem.

## 2024-08-28 ENCOUNTER — APPOINTMENT (OUTPATIENT)
Dept: RHEUMATOLOGY | Facility: CLINIC | Age: 59
End: 2024-08-28
Payer: MEDICAID

## 2024-08-28 VITALS
HEART RATE: 81 BPM | OXYGEN SATURATION: 98 % | HEIGHT: 60 IN | WEIGHT: 207 LBS | BODY MASS INDEX: 40.64 KG/M2 | TEMPERATURE: 97.8 F | DIASTOLIC BLOOD PRESSURE: 82 MMHG | RESPIRATION RATE: 18 BRPM | SYSTOLIC BLOOD PRESSURE: 138 MMHG

## 2024-08-28 DIAGNOSIS — M65.332 TRIGGER FINGER, LEFT MIDDLE FINGER: ICD-10-CM

## 2024-08-28 DIAGNOSIS — L94.0 LOCALIZED SCLERODERMA [MORPHEA]: ICD-10-CM

## 2024-08-28 DIAGNOSIS — R76.0 RAISED ANTIBODY TITER: ICD-10-CM

## 2024-08-28 DIAGNOSIS — M79.641 PAIN IN RIGHT HAND: ICD-10-CM

## 2024-08-28 DIAGNOSIS — R76.8 OTHER SPECIFIED ABNORMAL IMMUNOLOGICAL FINDINGS IN SERUM: ICD-10-CM

## 2024-08-28 DIAGNOSIS — M79.642 PAIN IN RIGHT HAND: ICD-10-CM

## 2024-08-28 DIAGNOSIS — M65.331 TRIGGER FINGER, RIGHT MIDDLE FINGER: ICD-10-CM

## 2024-08-28 PROCEDURE — 99204 OFFICE O/P NEW MOD 45 MIN: CPT

## 2024-08-28 NOTE — REASON FOR VISIT
[Initial Evaluation] : an initial evaluation [FreeTextEntry1] : joint pain, + ANASTACIO, + LAC, morphea hx

## 2024-08-28 NOTE — PHYSICAL EXAM
[General Appearance - Alert] : alert [General Appearance - In No Acute Distress] : in no acute distress [General Appearance - Well Nourished] : well nourished [Sclera] : the sclera and conjunctiva were normal [PERRL With Normal Accommodation] : pupils were equal in size, round, and reactive to light [Extraocular Movements] : extraocular movements were intact [Outer Ear] : the ears and nose were normal in appearance [Oropharynx] : the oropharynx was normal [Neck Appearance] : the appearance of the neck was normal [Respiration, Rhythm And Depth] : normal respiratory rhythm and effort [Auscultation Breath Sounds / Voice Sounds] : lungs were clear to auscultation bilaterally [Heart Rate And Rhythm] : heart rate was normal and rhythm regular [Heart Sounds] : normal S1 and S2 [Edema] : there was no peripheral edema [Abdomen Soft] : soft [Abdomen Tenderness] : non-tender [No Spinal Tenderness] : no spinal tenderness [Abnormal Walk] : normal gait [Nail Clubbing] : no clubbing  or cyanosis of the fingernails [Musculoskeletal - Swelling] : no joint swelling seen [Motor Tone] : muscle strength and tone were normal [] : no rash [FreeTextEntry1] : No sclerodactyly, Raynaud's. ?  Slight skin tightening on RUE though not clearly morphea [No Focal Deficits] : no focal deficits [Oriented To Time, Place, And Person] : oriented to person, place, and time

## 2024-08-28 NOTE — ASSESSMENT
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, remainder of APLS labs negative and no hx of recurrent thrombotic events - low suspicion for active rheum process at present - no further serological testing as wouldn't  at present  - scleroderma/CREST and CTD sx reviewed with her to monitor for - return if develops any   # b/l hand pain, limited ROM appears likely related to flexor tendon triggering  - XR hands to be thorough - conservative measures for pain reviewed with her -- moist heat, paraffin wax baths, topical voltaren gel, prn tylenol, massage - reviewed home exercises to maintain strength and ROM with her  - if not improved with above, advised to consider local injections with Ortho Dr Wesley (already following for knee)  All questions and concerns addressed to my best possible ability, patient verbalizes understanding and is agreeable. RTC PRN

## 2024-08-28 NOTE — HISTORY OF PRESENT ILLNESS
[FreeTextEntry1] : CHAITANYA WEISS is a 59 year old woman who presents for rheum eval, 10 years ago following with Derm and Dr Rai for dx of localized morphea on skin bx of R forearm by derm - never treated, currently no tight skin anywhere on body  + few months of b/l hand arthralgias, slightly limited fist ROM, AM stiffness approx. 30-60 min, no CTS sx, triggering over 3rd flexor tendons, ortho offered to inject but pt deferred at that time  + R knee OA, following with ortho - Ortho imaging - AP, lateral, and skyline views of the right knee were obtained today and revealed mild to moderate medial osteoarthritis  + occasional scalp and elbow rashes, resolves with topical cortisone cream, ?psoriasis but never dx   Scleroderma/CREST ROS negative for skin thickening, sclerodactyly, telangiectasias, GI motility issues, calcinosis, Raynauds   SLE ROS negative for focal alopecia, sicca, salivary gland swelling, oral ulcers, malar rash, photosensitivity, serositis, dysuria/ hematuria, rash, joint AM stiffness/synovitis, hematologic abnormalities, Raynauds. APLS ROS - 3 uncomplicated pregnancies, 1 pregnancy c/b pre-eclampsia, no miscarriages, no thrombotic events.   Prior labs in [facility] reviewed - ESR chronically 20-40s, sUA <6, 2023 + LAC by silica but neg by DRVVT and neg B2G and ACL titers. 2019 ANCA neg. Fluctuating ANASTACIO in speckled pattern but neg specifics in 2022, neg Scl-70, neg RF   FH - mother who passed away from scleroderma/CREST 25 years ago. Daughter with Hashimoto's.

## 2024-08-28 NOTE — CONSULT LETTER
[Dear  ___] : Dear  [unfilled], [Consult Letter:] : I had the pleasure of evaluating your patient, [unfilled]. [Please see my note below.] : Please see my note below. [Consult Closing:] : Thank you very much for allowing me to participate in the care of this patient.  If you have any questions, please do not hesitate to contact me. [Sincerely,] : Sincerely, [FreeTextEntry3] : Libia Perez MD Rheumatology [facility]   [address] [place] [id number] P: [phone] F: [phone]

## 2024-08-28 NOTE — CONSULT LETTER
[Dear  ___] : Dear  [unfilled], [Consult Letter:] : I had the pleasure of evaluating your patient, [unfilled]. [Please see my note below.] : Please see my note below. [Consult Closing:] : Thank you very much for allowing me to participate in the care of this patient.  If you have any questions, please do not hesitate to contact me. [Sincerely,] : Sincerely, [FreeTextEntry3] : Libia Perez MD Rheumatology [facility]   [address] [place] [id number] P: [phone] F: [phone]

## 2024-08-28 NOTE — REVIEW OF SYSTEMS
[Negative] : Heme/Lymph [Arthralgias] : arthralgias [Joint Pain] : joint pain [Joint Swelling] : no joint swelling [Joint Stiffness] : joint stiffness [As Noted in HPI] : as noted in HPI

## 2024-09-16 ENCOUNTER — APPOINTMENT (OUTPATIENT)
Dept: ORTHOPEDIC SURGERY | Facility: CLINIC | Age: 59
End: 2024-09-16
Payer: MEDICAID

## 2024-09-16 VITALS — DIASTOLIC BLOOD PRESSURE: 80 MMHG | SYSTOLIC BLOOD PRESSURE: 149 MMHG | HEART RATE: 83 BPM

## 2024-09-16 DIAGNOSIS — M17.12 UNILATERAL PRIMARY OSTEOARTHRITIS, LEFT KNEE: ICD-10-CM

## 2024-09-16 PROCEDURE — 99215 OFFICE O/P EST HI 40 MIN: CPT | Mod: 25

## 2024-09-16 PROCEDURE — 20610 DRAIN/INJ JOINT/BURSA W/O US: CPT | Mod: LT

## 2024-09-16 RX ADMIN — LIDOCAINE HYDROCHLORIDE 3 %: 10 INJECTION, SOLUTION INFILTRATION; PERINEURAL at 00:00

## 2024-09-16 RX ADMIN — METHYLPREDNISOLONE ACETATE 2 MG/ML: 40 INJECTION, SUSPENSION INTRA-ARTICULAR; INTRALESIONAL; INTRAMUSCULAR; SOFT TISSUE at 00:00

## 2024-09-16 NOTE — PHYSICAL EXAM
[Antalgic] : antalgic [LE] : Sensory: Intact in bilateral lower extremities [ALL] : dorsalis pedis, posterior tibial, femoral, popliteal, and radial 2+ and symmetric bilaterally [de-identified] : On physical examination of the left knee.  The skin is clean dry and intact with no areas of redness swelling or heat noted.  There is some pain and tenderness primarily in the patellofemoral compartment as well as the medial femoral-tibial compartment.  She does have excellent range of motion.  She is able to fully extend the knee with flexion to at least 130 degrees.  This is done both passive and active forward.  There is no palpable defect noted.  No muscle atrophy.  No evidence of any motor or sensory deficit.  Patient has good distal pulses with no calf tenderness.  Annette's and compression test were both negative. [de-identified] : No x-rays were performed in the office today.  However previous x-rays performed by Dr. Wesley 2 months ago were reviewed.  It does show some narrowing of the joint spacing primarily in the patellofemoral compartment but mostly in the medial femoral-tibial compartment.  There is no evidence of any fracture or dislocation.

## 2024-09-16 NOTE — DISCUSSION/SUMMARY
[de-identified] : After a thorough history, full evaluation and review of x-rays.  It was explained to the patient that there is osteoarthritic changes in the left knee.  The patient was explained the different modalities of treatment which include conservative versus surgical intervention.  The patient was offered an injection here in the office today for the left knee.  Prior to the injection. The patient was explained the risks, benefits, pros and cons, as well as alternatives.  It was explained that there is no guarantee for relief following the injection; and that this is not a cure for the osteoarthritis.  It was also explained to the patient that if there is any relief, this may be short-lived, as the pain may return.  This was explained in great detail in which the patient fully understood and agreed to the injection.  Therefore, it was given under sterile conditions without any complications.    The patient was also advised to continue with conservative management.  This includes a good exercise program consisting of isometric,range of motion and strengthening exercises. It is also advised to continue with ice packs, moist heat and pain medications in the form of anti-inflammatories.  The patient is advised to continue with these conservative measures and to return back to the office in another month or 2 for reevaluation and management.  However, if there is any increase in pain, redness, swelling, heat, discharge or fever.  The patient is advised to notify the office immediately. Every request will also be made through her insurance company for viscosupplementation injections.  Due to her insurance this needs to be authorized prior to the giving it to her as the knee.  Therefore a formal request will be made to the insurance company.  45 minutes were spent, face to face, in direct consultation with the patient. This includes reviewing the natural history of their Dx., eliciting the history, performing an orthopedic exam, review of the x-ray findings, forming a differential Dx and discussing all treatment options. This Includes both surgical and non-surgical treatments. I also reviewed all the risks and benefits of non-operative & operative Tx options, future impact into orthopedic functions/problems, activity restrictions both at home and at work, and all follow up requirements.

## 2024-09-16 NOTE — PROCEDURE
[Injection] : Injection [Left] : of the left [Knee Joint] : knee joint [Osteoarthritis] : Osteoarthritis [Patient] : patient [Risk] : risk [Benefits] : benefits [Alternatives] : alternatives [Bleeding] : bleeding [Infection] : infection [Allergic Reaction] : allergic reaction [Verbal Consent Obtained] : verbal consent was obtained prior to the procedure [Alcohol] : Alcohol [Ethyl Chloride Spray] : ethyl chloride spray was used as a topical anesthetic [Medial] : medial [Anterior] : anterior [25] : a 25-gauge [1% Lidocaine___(mL)] : [unfilled] mL of 1% Lidocaine [Methylpred. 40mg/mL___(mL)] : [unfilled] mL of 40mg/mL methylprednisolone [Bandage Applied] : a bandage [Tolerated Well] : The patient tolerated the procedure well [None] : none

## 2024-09-16 NOTE — END OF VISIT
Mom called to refill Dk's Rx:  escitalopram (LEXAPRO) 5 mg tablet [[id number]]   Mom was not sure if you told Cortney Lo to increase to 7 5mg or not? The refill is only for 5mg and they have been breaking a second pill in half for an additional 2 5mg  Please send to:    [address] [place] [address], [address], [address] is requesting a call back if you refill for 5mg or 7 5mg just so she knows - I can call her if you want me to  [Time Spent: ___ minutes] : I have spent [unfilled] minutes of time on the encounter which excludes teaching and separately reported services.

## 2024-09-16 NOTE — HISTORY OF PRESENT ILLNESS
[de-identified] : Patient is a 59-year-old female who presents today for an evaluation regarding her left knee.  She states that she has had pain for a prolonged period of time.  And is progressively getting worse.  She describes the pain as a on and off discomfort which occurs mostly with any strenuous activities.  Including standing walking and even going up and down stairs.  She states that she has tried some Advil as well as some Tylenol but still states that the pain still persist.  The pain can be up to a 9 or 10 on a scale of 1-10.  Most recently she did see Dr. Wesley who performed x-rays and advised her continue with conservative measures.  She decided to seek second opinion.  Therefore she presents today for an evaluation of the left knee.

## 2024-09-16 NOTE — DISCUSSION/SUMMARY
[de-identified] : After a thorough history, full evaluation and review of x-rays.  It was explained to the patient that there is osteoarthritic changes in the left knee.  The patient was explained the different modalities of treatment which include conservative versus surgical intervention.  The patient was offered an injection here in the office today for the left knee.  Prior to the injection. The patient was explained the risks, benefits, pros and cons, as well as alternatives.  It was explained that there is no guarantee for relief following the injection; and that this is not a cure for the osteoarthritis.  It was also explained to the patient that if there is any relief, this may be short-lived, as the pain may return.  This was explained in great detail in which the patient fully understood and agreed to the injection.  Therefore, it was given under sterile conditions without any complications.    The patient was also advised to continue with conservative management.  This includes a good exercise program consisting of isometric,range of motion and strengthening exercises. It is also advised to continue with ice packs, moist heat and pain medications in the form of anti-inflammatories.  The patient is advised to continue with these conservative measures and to return back to the office in another month or 2 for reevaluation and management.  However, if there is any increase in pain, redness, swelling, heat, discharge or fever.  The patient is advised to notify the office immediately. Every request will also be made through her insurance company for viscosupplementation injections.  Due to her insurance this needs to be authorized prior to the giving it to her as the knee.  Therefore a formal request will be made to the insurance company.  45 minutes were spent, face to face, in direct consultation with the patient. This includes reviewing the natural history of their Dx., eliciting the history, performing an orthopedic exam, review of the x-ray findings, forming a differential Dx and discussing all treatment options. This Includes both surgical and non-surgical treatments. I also reviewed all the risks and benefits of non-operative & operative Tx options, future impact into orthopedic functions/problems, activity restrictions both at home and at work, and all follow up requirements.

## 2024-09-16 NOTE — REASON FOR VISIT
[Initial Visit] : an initial visit for [Family Member] : family member [FreeTextEntry2] : Left knee pain

## 2024-09-16 NOTE — HISTORY OF PRESENT ILLNESS
[de-identified] : Patient is a 59-year-old female who presents today for an evaluation regarding her left knee.  She states that she has had pain for a prolonged period of time.  And is progressively getting worse.  She describes the pain as a on and off discomfort which occurs mostly with any strenuous activities.  Including standing walking and even going up and down stairs.  She states that she has tried some Advil as well as some Tylenol but still states that the pain still persist.  The pain can be up to a 9 or 10 on a scale of 1-10.  Most recently she did see Dr. Wesley who performed x-rays and advised her continue with conservative measures.  She decided to seek second opinion.  Therefore she presents today for an evaluation of the left knee.

## 2024-09-16 NOTE — PHYSICAL EXAM
[Antalgic] : antalgic [LE] : Sensory: Intact in bilateral lower extremities [ALL] : dorsalis pedis, posterior tibial, femoral, popliteal, and radial 2+ and symmetric bilaterally [de-identified] : On physical examination of the left knee.  The skin is clean dry and intact with no areas of redness swelling or heat noted.  There is some pain and tenderness primarily in the patellofemoral compartment as well as the medial femoral-tibial compartment.  She does have excellent range of motion.  She is able to fully extend the knee with flexion to at least 130 degrees.  This is done both passive and active forward.  There is no palpable defect noted.  No muscle atrophy.  No evidence of any motor or sensory deficit.  Patient has good distal pulses with no calf tenderness.  Annette's and compression test were both negative. [de-identified] : No x-rays were performed in the office today.  However previous x-rays performed by Dr. Wesley 2 months ago were reviewed.  It does show some narrowing of the joint spacing primarily in the patellofemoral compartment but mostly in the medial femoral-tibial compartment.  There is no evidence of any fracture or dislocation.

## 2024-09-17 ENCOUNTER — APPOINTMENT (OUTPATIENT)
Dept: NEUROLOGY | Facility: CLINIC | Age: 59
End: 2024-09-17

## 2024-09-17 RX ORDER — LIDOCAINE HYDROCHLORIDE 10 MG/ML
1 INJECTION, SOLUTION INFILTRATION; PERINEURAL
Refills: 0 | Status: COMPLETED | OUTPATIENT
Start: 2024-09-16

## 2024-09-17 RX ORDER — METHYLPRED ACET/NACL,ISO-OS/PF 40 MG/ML
40 VIAL (ML) INJECTION
Refills: 0 | Status: COMPLETED | OUTPATIENT
Start: 2024-09-16

## 2024-09-18 RX ORDER — HYALURONATE SODIUM, STABILIZED 60 MG/3 ML
60 SYRINGE (ML) INTRAARTICULAR
Qty: 1 | Refills: 0 | Status: ACTIVE | COMMUNITY
Start: 2024-09-17 | End: 1900-01-01

## 2024-11-11 ENCOUNTER — APPOINTMENT (OUTPATIENT)
Dept: ORTHOPEDIC SURGERY | Facility: CLINIC | Age: 59
End: 2024-11-11

## 2024-11-18 ENCOUNTER — NON-APPOINTMENT (OUTPATIENT)
Age: 59
End: 2024-11-18

## 2024-12-03 ENCOUNTER — APPOINTMENT (OUTPATIENT)
Dept: VASCULAR SURGERY | Facility: CLINIC | Age: 59
End: 2024-12-03

## 2024-12-03 ENCOUNTER — APPOINTMENT (OUTPATIENT)
Dept: ORTHOPEDIC SURGERY | Facility: CLINIC | Age: 59
End: 2024-12-03
Payer: MEDICAID

## 2024-12-03 DIAGNOSIS — M25.562 PAIN IN LEFT KNEE: ICD-10-CM

## 2024-12-03 PROCEDURE — 99213 OFFICE O/P EST LOW 20 MIN: CPT | Mod: 25

## 2024-12-03 PROCEDURE — 20610 DRAIN/INJ JOINT/BURSA W/O US: CPT | Mod: LT

## 2024-12-09 ENCOUNTER — OUTPATIENT (OUTPATIENT)
Dept: OUTPATIENT SERVICES | Facility: HOSPITAL | Age: 59
LOS: 1 days | End: 2024-12-09
Payer: MEDICAID

## 2024-12-09 ENCOUNTER — APPOINTMENT (OUTPATIENT)
Dept: ULTRASOUND IMAGING | Facility: CLINIC | Age: 59
End: 2024-12-09

## 2024-12-09 ENCOUNTER — APPOINTMENT (OUTPATIENT)
Dept: ULTRASOUND IMAGING | Facility: HOSPITAL | Age: 59
End: 2024-12-09
Payer: MEDICAID

## 2024-12-09 ENCOUNTER — NON-APPOINTMENT (OUTPATIENT)
Age: 59
End: 2024-12-09

## 2024-12-09 ENCOUNTER — RESULT REVIEW (OUTPATIENT)
Age: 59
End: 2024-12-09

## 2024-12-09 DIAGNOSIS — Z98.89 OTHER SPECIFIED POSTPROCEDURAL STATES: Chronic | ICD-10-CM

## 2024-12-09 DIAGNOSIS — M25.562 PAIN IN LEFT KNEE: ICD-10-CM

## 2024-12-09 DIAGNOSIS — Z98.51 TUBAL LIGATION STATUS: Chronic | ICD-10-CM

## 2024-12-09 PROCEDURE — 93970 EXTREMITY STUDY: CPT

## 2024-12-09 PROCEDURE — 93970 EXTREMITY STUDY: CPT | Mod: 26

## 2024-12-24 PROBLEM — F10.90 ALCOHOL USE: Status: INACTIVE | Noted: 2019-01-16

## 2025-01-05 NOTE — ED PROVIDER NOTE - CARE PROVIDER_API CALL
AVS reviewed with patient. Educated about return precautions and follow up care, opportunity for questions provided. Patient in agreement with plan of care and disposition. Patient ambulatory out of ED with strong, steady gait     
Cameron Gonzalez.  Neurology  [address] [facility], [address]  [place] [address]  Phone: [phone]  Fax: [phone]  Follow Up Time:

## 2025-02-13 ENCOUNTER — NON-APPOINTMENT (OUTPATIENT)
Age: 60
End: 2025-02-13

## 2025-04-25 ENCOUNTER — APPOINTMENT (OUTPATIENT)
Dept: CARDIOLOGY | Facility: CLINIC | Age: 60
End: 2025-04-25

## 2025-05-21 ENCOUNTER — OUTPATIENT (OUTPATIENT)
Dept: OUTPATIENT SERVICES | Facility: HOSPITAL | Age: 60
LOS: 1 days | End: 2025-05-21
Payer: MEDICAID

## 2025-05-21 ENCOUNTER — APPOINTMENT (OUTPATIENT)
Dept: RADIOLOGY | Facility: HOSPITAL | Age: 60
End: 2025-05-21
Payer: MEDICAID

## 2025-05-21 DIAGNOSIS — Z98.51 TUBAL LIGATION STATUS: Chronic | ICD-10-CM

## 2025-05-21 DIAGNOSIS — Z98.89 OTHER SPECIFIED POSTPROCEDURAL STATES: Chronic | ICD-10-CM

## 2025-05-21 DIAGNOSIS — Z00.8 ENCOUNTER FOR OTHER GENERAL EXAMINATION: ICD-10-CM

## 2025-05-21 PROCEDURE — 71046 X-RAY EXAM CHEST 2 VIEWS: CPT | Mod: 26

## 2025-05-21 PROCEDURE — 71046 X-RAY EXAM CHEST 2 VIEWS: CPT

## 2025-08-16 ENCOUNTER — APPOINTMENT (OUTPATIENT)
Dept: ORTHOPEDIC SURGERY | Facility: CLINIC | Age: 60
End: 2025-08-16

## 2025-09-13 ENCOUNTER — APPOINTMENT (OUTPATIENT)
Dept: ORTHOPEDIC SURGERY | Facility: CLINIC | Age: 60
End: 2025-09-13
Payer: MEDICAID

## 2025-09-13 VITALS — HEIGHT: 60 IN | BODY MASS INDEX: 38.28 KG/M2 | WEIGHT: 195 LBS

## 2025-09-13 DIAGNOSIS — M17.12 UNILATERAL PRIMARY OSTEOARTHRITIS, LEFT KNEE: ICD-10-CM

## 2025-09-13 PROCEDURE — 73562 X-RAY EXAM OF KNEE 3: CPT | Mod: LT

## 2025-09-13 PROCEDURE — 99214 OFFICE O/P EST MOD 30 MIN: CPT
